# Patient Record
Sex: FEMALE | Race: WHITE | Employment: FULL TIME | URBAN - METROPOLITAN AREA
[De-identification: names, ages, dates, MRNs, and addresses within clinical notes are randomized per-mention and may not be internally consistent; named-entity substitution may affect disease eponyms.]

---

## 2017-05-03 ENCOUNTER — HOSPITAL ENCOUNTER (OUTPATIENT)
Dept: MRI IMAGING | Age: 42
Discharge: HOME | End: 2017-05-03
Payer: COMMERCIAL

## 2017-05-03 DIAGNOSIS — M51.36 DDD (DEGENERATIVE DISC DISEASE), LUMBAR: ICD-10-CM

## 2017-07-20 ENCOUNTER — HOSPITAL ENCOUNTER (OUTPATIENT)
Dept: ULTRASOUND IMAGING | Age: 42
Discharge: HOME | End: 2017-07-20
Payer: COMMERCIAL

## 2017-07-20 ENCOUNTER — HOSPITAL ENCOUNTER (OUTPATIENT)
Dept: WOMENS IMAGING | Age: 42
Discharge: HOME | End: 2017-07-20
Payer: COMMERCIAL

## 2017-07-20 DIAGNOSIS — R92.8 ABNORMAL MAMMOGRAM OF RIGHT BREAST: ICD-10-CM

## 2017-07-20 DIAGNOSIS — R92.8 ABNORMALITY OF RIGHT BREAST ON SCREENING MAMMOGRAPHY: ICD-10-CM

## 2017-07-20 PROCEDURE — 76642 ULTRASOUND BREAST LIMITED: CPT

## 2017-07-20 PROCEDURE — G0206 DX MAMMO INCL CAD UNI: HCPCS

## 2017-08-29 ENCOUNTER — HOSPITAL ENCOUNTER (OUTPATIENT)
Dept: MRI IMAGING | Age: 42
Discharge: HOME | End: 2017-08-29
Payer: COMMERCIAL

## 2017-08-29 PROCEDURE — 72148 MRI LUMBAR SPINE W/O DYE: CPT

## 2018-02-01 ENCOUNTER — HOSPITAL ENCOUNTER (OUTPATIENT)
Dept: ULTRASOUND IMAGING | Age: 43
Discharge: HOME | End: 2018-02-03
Payer: COMMERCIAL

## 2018-02-01 ENCOUNTER — HOSPITAL ENCOUNTER (OUTPATIENT)
Dept: WOMENS IMAGING | Age: 43
Discharge: HOME | End: 2018-02-03
Payer: COMMERCIAL

## 2018-02-01 DIAGNOSIS — Z87.898 HISTORY OF ABNORMAL MAMMOGRAM: ICD-10-CM

## 2018-02-01 DIAGNOSIS — R92.8 BI-RADS CATEGORY 3 MAMMOGRAM RESULT: ICD-10-CM

## 2018-02-01 PROCEDURE — 77066 DX MAMMO INCL CAD BI: CPT

## 2018-02-01 PROCEDURE — 76642 ULTRASOUND BREAST LIMITED: CPT

## 2019-04-04 ENCOUNTER — HOSPITAL ENCOUNTER (OUTPATIENT)
Dept: WOMENS IMAGING | Age: 44
Discharge: HOME | End: 2019-04-06
Payer: COMMERCIAL

## 2019-04-04 DIAGNOSIS — Z12.31 BREAST CANCER SCREENING BY MAMMOGRAM: ICD-10-CM

## 2019-04-04 PROCEDURE — 77063 BREAST TOMOSYNTHESIS BI: CPT

## 2020-01-30 ENCOUNTER — HOSPITAL ENCOUNTER (OUTPATIENT)
Dept: GENERAL RADIOLOGY | Age: 45
Discharge: HOME | End: 2020-02-01
Payer: COMMERCIAL

## 2020-01-30 PROCEDURE — 72050 X-RAY EXAM NECK SPINE 4/5VWS: CPT

## 2020-03-05 ENCOUNTER — HOSPITAL ENCOUNTER (OUTPATIENT)
Dept: PHYSICAL THERAPY | Age: 45
Setting detail: THERAPIES SERIES
Discharge: HOME | End: 2020-03-05
Payer: COMMERCIAL

## 2020-03-05 PROCEDURE — 97161 PT EVAL LOW COMPLEX 20 MIN: CPT

## 2020-03-05 PROCEDURE — 97110 THERAPEUTIC EXERCISES: CPT

## 2020-03-05 ASSESSMENT — PAIN DESCRIPTION - FREQUENCY: FREQUENCY: CONTINUOUS

## 2020-03-05 ASSESSMENT — PAIN DESCRIPTION - PROGRESSION: CLINICAL_PROGRESSION: NOT CHANGED

## 2020-03-05 ASSESSMENT — PAIN DESCRIPTION - DESCRIPTORS: DESCRIPTORS: CONSTANT;TIGHTNESS;SORE

## 2020-03-05 ASSESSMENT — PAIN DESCRIPTION - ONSET: ONSET: SUDDEN

## 2020-03-05 ASSESSMENT — PAIN DESCRIPTION - LOCATION: LOCATION: NECK

## 2020-03-05 ASSESSMENT — PAIN SCALES - GENERAL: PAINLEVEL_OUTOF10: 3

## 2020-03-05 ASSESSMENT — PAIN - FUNCTIONAL ASSESSMENT: PAIN_FUNCTIONAL_ASSESSMENT: PREVENTS OR INTERFERES WITH ALL ACTIVE AND SOME PASSIVE ACTIVITIES

## 2020-03-05 ASSESSMENT — PAIN DESCRIPTION - ORIENTATION: ORIENTATION: RIGHT

## 2020-03-05 ASSESSMENT — PAIN DESCRIPTION - PAIN TYPE: TYPE: CHRONIC PAIN

## 2020-03-05 NOTE — PROGRESS NOTES
[address]  PHYSICAL THERAPY EVALUATION    Date: 3/5/2020  Patient Name: Deborah Perez       MRN: [id number]   Account: [de-identified]   : 1975  (53 y.o.)   Gender: female   Referring Practitioner: Miki Galvin                 Diagnosis: Mid-cervical disc disorder, unspecified level, Other cervical disc degeneration, mid-cervical region, unspecified level  Treatment Diagnosis: neck pain, B periscapular strength, decreased cervical arom, decreased postural awareness, decreased functional activity tolerance, and increased cervcal mm tightness  Additional Pertinent Hx: Xray= mild degenerative changes         Past Medical History:  has a past medical history of Anxiety, Chronic back pain, Depression, Headache(784.0), Musculoskeletal pain, Neck pain, and Type II or unspecified type diabetes mellitus without mention of complication, not stated as uncontrolled. Past Surgical History:   has a past surgical history that includes Foot surgery (Left) and Arm Surgery (Left). Vital Signs  Patient Currently in Pain: Yes   Pain Screening  Patient Currently in Pain: Yes  Pain Assessment  Pain Assessment: 0-10  Pain Level: 3(Painn ranges from 1-7/10)  Pain Type: Chronic pain  Pain Location: Neck  Pain Orientation: Right  Pain Descriptors: Constant;Tightness; Sore  Pain Frequency: Continuous  Pain Onset: Sudden  Clinical Progression: Not changed  Functional Pain Assessment: Prevents or interferes with all active and some passive activities  Non-Pharmaceutical Pain Intervention(s): Rest     ADL Assistance: Independent  Homemaking Assistance: Independent  Homemaking Responsibilities: Yes  Ambulation Assistance: Independent  Transfer Assistance: Independent  Active : Yes  Occupation: Full time employment  Type of occupation: / at [employer]   [facility]ADL [facility]   Additional Comments: R hand dominant      Subjective:  Subjective: Pt reports havign a HA in January 2020 that she went to MD for who took xrays which showed arthritis. MD then referred her to PT and pt takes pain meds prior secondary to fibromyalgia. Pt sees pain managemtne regularly. Pain in neck as well as HA's have been intermittent since January. Denies changes in vision, speech, swallowing, radicular symptoms, dizziness, or unexplained weight loss. Comments: RTD today     Objective:   Strength RUE  Comment: 4+/5 except abd, ER, MT, Rhomboids 4/5 Lats, LT 4-/5   Strength LUE  Comment: 4+/5 except abd, MT, Rhomboids, Lats 4/5 LT 4-/5     AROM RUE (degrees)  RUE AROM : WFL  AROM LUE (degrees)  LUE AROM : WFL    Spine  Cervical: flex 50 ext 45 SB L 38 35 Rot R L 45 56(pain with end range ext and B SB)    Observation/Palpation  Posture: Fair  Palpation: significant tightness throughout B UT, LS, scalenes, cervical paraspinals, sub-occipitals; TTP throughout R side with multiple palpable TP's   Observation: rounded shoulders, FHP, prominent CTJ, protracted scaps     Additional Measures  Special Tests: (+) L cervical flexion/rotation, (-) cervical distraction  Other: relief with cervical distraction    Exercises:   Exercises  Exercise 1: supine chin nods 5\"x10   Exercise 2: supine chin nod with L rotation 5\"x10   Exercise 3: scap retractions 5\"x10   Exercise 4: cervical arom x10   Exercise 5: UBE for postural strengthening*   Exercise 6: supine cerv retractions*  Exercise 7: corner stretch*  Exercise 8: chest pulls*  Exercise 9: B ER*  Exercise 10: prone horiz abd IR/ER*  Exercise 11:  [place] and arrow*  Exercise 12: tband rows/lats*   Exercise 20: HEP: scap retract, chin nods, chin nods with L rotation, cervical arom    Modalities:  Modalities  Moist heat: PRN to decrease mm tightness/pain* pt declined this date  Cervical traction: consider if relief with manual*  Manual:  Manual therapy  Manual traction: cervical spine*   Soft Tissue Mobalization: STM B UT, cervical parspinals, LSS, [] Yes  [x] No 15  0    Ambulatory Aid [] Furniture  [] Crutches/cane/walker  [x] None/bedrest/wheelchair/nurse 30  15  0    IV/Heparin Lock [] Yes  [x] No 20  0    Gait/Transferring [] Impaired  [] Weak  [x] Normal/bedrest/immobile 20  10  0    Mental Status [] Forgets limitations  [x] Oriented to own ability 15  0       Total: 0     Based on the Assessment score: check the appropriate box.   [x]  No intervention needed   Low =   Score of 0-24  []  Use standard prevention interventions Moderate =  Score of 24-44   [] Discuss fall prevention strategies   [] Indicate moderate falls risk on eval  []  Use high risk prevention interventions High = Score of 45 and higher   [] Discuss fall prevention strategies   [] Provide supervision during treatment time    Goals  Short term goals  Time Frame for Short term goals: 2-3 weeks   Short term goal 1: The pt will demonstrate improved postural awareness requiring <25% VC's with exercises  Short term goal 2: The pt will report decreased neck pain </=2/10 in order to perform functional ADL's  Long term goals  Time Frame for Long term goals : 5 weeks   Long term goal 1: The pt will demo improved cervical ext, B SB, and rotation AROM WFL's in order to increase ease with ADL's  Long term goal 2: The pt will demonstrate improved B periscapular strength >/=4 to 4+/5 in order to lift/carry with decreased pain   Long term goal 3: The pt will have a decrease in NDI score >/=10 points in order to increase functional activity tolerance   Long term goal 4: The pt will be indep/compliant with HEP in order to self manage symptoms upon D/C    Treatment Initiated : ther ex and hep     PT Individual Minutes  Time In: 1104  Time Out: 6675  Minutes: 33  Timed Code Treatment Minutes: 10 Minutes  Procedure Minutes: 23 eval     Timed Activity Minutes Units   Ther Ex 10 1     Electronically signed by Tony Vitale PT on 3/5/20 at 11:49 AM

## 2020-03-05 NOTE — PROGRESS NOTES
13/50 [] yes  [x] no   Long term goal 4: The pt will be indep/compliant with HEP in order to self manage symptoms upon D/C ongoing [] yes  [x] no     Body structures, Functions, Activity limitations: Decreased ADL status, Decreased ROM, Decreased strength, Increased pain, Decreased posture  Assessment: Pt presents with onset of neck pain >/=2 months with decreased B periscapular strength, decreased cervical arom, decreased postural awareness, decreased functional activity tolerance, and increased cervcal mm tightness. These impairments currently limit her fucntional abilities to lift, carry, drive, read, or sleep without increased pain or limitations at PLOF. Prognosis: Good, Excellent  Discharge Recommendations: Continue to assess pending progress    PT Education: Goals;PT Role;Plan of Care;Home Exercise Program  Patient Education: discussed cupping technique/procedure/benefits     PLAN: [x] Evaluate and Treat  Frequency/Duration:  Plan  Times per week: 2  Plan weeks: 5  Current Treatment Recommendations: Strengthening, ROM, Patient/Caregiver Education & Training, Manual Therapy - Joint Manipulation, Manual Therapy - Soft Tissue Mobilization, Home Exercise Program, Integrated Dry Needling, Modalities, Pain Management, Positioning                  Patient Status:[x] Continue/ Initiate plan of Care    [] Discharge PT. Recommend pt continue with HEP. [] Additional visits requested, Please re-certify for additional visits:        Signature: Electronically signed by Lisa Lindo PT on 3/5/20 at 11:50 AM    If you have any questions or concerns, please don't hesitate to call. Thank you for your referral.    I have reviewed this plan of care and certify a need for medically necessary rehabilitation services.     Physician Signature:__________________________________________________________  Date:  Please sign and return

## 2020-03-12 ENCOUNTER — HOSPITAL ENCOUNTER (OUTPATIENT)
Dept: PHYSICAL THERAPY | Age: 45
Setting detail: THERAPIES SERIES
Discharge: HOME | End: 2020-03-12
Payer: COMMERCIAL

## 2020-03-12 NOTE — PROGRESS NOTES
[address]       Physical Therapy  Cancellation/No-show Note  Patient Name:  Edilma Smith  :  1975   Date:  3/12/2020  Referring Practitioner: Frances Simmonds  Diagnosis: Mid-cervical disc disorder, unspecified level, Other cervical disc degeneration, mid-cervical region, unspecified level    Visit Information:  PT Visit Information  Onset Date: 20  [facility]   Total # of Visits Approved: 30  Total # of Visits to Date: 1  No Show: 0  Canceled Appointment: 1  Progress Note Counter: 1/10 CX 3/12/20    For today's appointment patient:  [x]  Cancelled  []  Rescheduled appointment  []  No-show   []  Called pt to remind of next appointment     Reason given by patient:  [x]  Patient ill  []  Conflicting appointment  []  No transportation    []  Conflict with work  []  No reason given  []  Other:      Signature: Electronically signed by Negin Cervantes PTA on 3/12/20 at 12:10 PM EDT

## 2020-03-19 ENCOUNTER — HOSPITAL ENCOUNTER (OUTPATIENT)
Dept: PHYSICAL THERAPY | Age: 45
Setting detail: THERAPIES SERIES
Discharge: HOME | End: 2020-03-19
Payer: COMMERCIAL

## 2020-03-19 PROCEDURE — 97110 THERAPEUTIC EXERCISES: CPT

## 2020-03-19 PROCEDURE — 97140 MANUAL THERAPY 1/> REGIONS: CPT

## 2020-03-19 ASSESSMENT — PAIN DESCRIPTION - ORIENTATION: ORIENTATION: LEFT

## 2020-03-19 ASSESSMENT — PAIN DESCRIPTION - DESCRIPTORS: DESCRIPTORS: CONSTANT

## 2020-03-19 ASSESSMENT — PAIN DESCRIPTION - LOCATION: LOCATION: NECK

## 2020-03-19 ASSESSMENT — PAIN SCALES - GENERAL: PAINLEVEL_OUTOF10: 3

## 2020-03-19 NOTE — PROGRESS NOTES
[address] [address]  Outpatient Physical Therapy    Treatment Note        Date: 3/19/2020  Patient: Dennys Gamez  : 1975  ACCT #: [de-identified]  Referring Practitioner: Noy Elizabeth  Diagnosis: Mid-cervical disc disorder, unspecified level, Other cervical disc degeneration, mid-cervical region, unspecified level    Visit Information:  PT Visit Information  Onset Date: 20  PT Insurance Information: caresource   Total # of Visits Approved: 30  Total # of Visits to Date: 2  No Show: 0  Canceled Appointment: 1  Progress Note Counter: 2/10    Subjective: Pt reports tightness with increased stress at work. Pt works at [employer]. HEP Compliance:  [x] Good [] Fair [] Poor [] Reports not doing due to:    Vital Signs  Patient Currently in Pain: Yes   Pain Screening  Patient Currently in Pain: Yes  Pain Assessment  Pain Assessment: 0-10  Pain Level: 3  Pain Location: Neck  Pain Orientation: Left  Pain Descriptors: Constant    OBJECTIVE:   Exercises  Exercise 1: supine chin nods 5 sec x 10   Exercise 2: supine chin nod with L rotation 5 sec x 10   Exercise 3: scap retractions 5 sec x 15  Exercise 4: cervical arom x10   Exercise 5: UBE for postural strengthening L1 F/R x2 min ea  Exercise 7: corner stretch arms low 30 sec x 3   Exercise 8: Supine chest pulls IR/ER YTB x10 - trialed standing with improved tolerance  Exercise 20: HEP: corner str, chest pulls     Manual:   Manual therapy  Manual traction: cervical spine*   Soft Tissue Mobalization: STM B UT, cervical parspinals, LSS, scalenes, sub-occipitals, consider MFD with cupping*  Other: total manual: 15 min    *Indicates exercise, modality, or manual techniques to be initiated when appropriate    Assessment: Body structures, Functions, Activity limitations: Decreased ADL status, Decreased ROM, Decreased strength, Increased pain, Decreased posture  Assessment: Initiated therex and manual per PT POC.  Pt requiring MOD cues for proper technique with

## 2020-03-30 NOTE — PROGRESS NOTES
Due to the COVID-19 pandemic and the subsequent directive from Tony Landry, [address] and [facility] ([facility]), this patient's program is being diverted to a home exercise based program starting March 23, 2020. A formal home program has been or will be established for the patient while services are temporarily suspended. Full frequency may be re-instated upon lifting of the directive.      Electronically signed by Ben Reynoso PT on 3/30/20 at 1:50 PM EDT

## 2020-04-21 ENCOUNTER — CLINICAL DOCUMENTATION (OUTPATIENT)
Dept: PHYSICAL THERAPY | Age: 45
End: 2020-04-21

## 2020-05-20 ENCOUNTER — CLINICAL DOCUMENTATION (OUTPATIENT)
Dept: PHYSICAL THERAPY | Age: 45
End: 2020-05-20

## 2020-06-01 ENCOUNTER — CLINICAL DOCUMENTATION (OUTPATIENT)
Dept: PHYSICAL THERAPY | Age: 45
End: 2020-06-01

## 2020-06-09 ENCOUNTER — TELEPHONE (OUTPATIENT)
Dept: OBGYN CLINIC | Age: 45
End: 2020-06-09

## 2020-06-18 ENCOUNTER — HOSPITAL ENCOUNTER (OUTPATIENT)
Dept: PHYSICAL THERAPY | Age: 45
Setting detail: THERAPIES SERIES
Discharge: HOME | End: 2020-06-18
Payer: COMMERCIAL

## 2020-06-18 PROCEDURE — 97110 THERAPEUTIC EXERCISES: CPT

## 2020-06-18 PROCEDURE — 97140 MANUAL THERAPY 1/> REGIONS: CPT

## 2020-06-18 ASSESSMENT — PAIN DESCRIPTION - DESCRIPTORS: DESCRIPTORS: TIGHTNESS

## 2020-06-18 ASSESSMENT — PAIN DESCRIPTION - LOCATION: LOCATION: NECK

## 2020-06-18 ASSESSMENT — PAIN DESCRIPTION - ORIENTATION: ORIENTATION: RIGHT;LEFT

## 2020-06-18 ASSESSMENT — PAIN SCALES - GENERAL: PAINLEVEL_OUTOF10: 4

## 2020-06-18 ASSESSMENT — PAIN DESCRIPTION - PAIN TYPE: TYPE: CHRONIC PAIN

## 2020-06-18 NOTE — PROGRESS NOTES
[address]     Ph: [phone]  Fax: [phone]    [] Certification  [] Recertification []  Plan of Care  [x] Progress Note [] Discharge      To:  Yany Yee      From:  Marget Romberg, PT, DPT  Patient: Mery Bae     : 1975  Diagnosis: Mid-cervical disc disorder, unspecified level, Other cervical disc degeneration, mid-cervical region, unspecified level     Date: 2020  Treatment Diagnosis: neck pain, B periscapular strength, decreased cervical arom, decreased postural awareness, decreased functional activity tolerance, and increased cervcal mm tightness     Progress Report Period from:  3/5/2020  to 2020    Total # of Visits to Date: 3   No Show: 0    Canceled Appointment: 1     OBJECTIVE:   Short Term Goals - Time Frame for Short term goals: 2-3 weeks     Goals Current/Discharge status  Met   Short term goal 1: The pt will demonstrate improved postural awareness requiring <25% VC's with exercises  Improving awareness with VC's ~25% during tx  [x] yes  [] no   Short term goal 2: The pt will report decreased neck pain </=2/10 in order to perform functional ADL's  6/10 at worst that is \"tight\" [] yes  [x] no     Long Term Goals - Time Frame for Long term goals : 5 weeks   Goals Current/ Discharge status Met   Long term goal 1: The pt will demo improved cervical ext, B SB, and rotation AROM WFL's in order to increase ease with ADL's Spine  Cervical: ext 37 SB L 32 R 30 Rot R 65 L 60   [] yes  [x] no   Long term goal 2: The pt will demonstrate improved B periscapular strength >/=4 to 4+/5 in order to lift/carry with decreased pain  Strength RUE  Comment: MT 4/5 Rhomboids, LT, Lats 4-/5   Strength LUE  Comment: Rhomboids, Lats 4/5 LT, MT 3+/5  [] yes  [x] no   Long term goal 3: The pt will have a decrease in NDI score >/=10 points in order to increase functional activity tolerance  NDI= 10/50 [] yes  [x] no   Long term goal 4: The pt will be indep/compliant with HEP in order to self manage symptoms upon D/C Compliant with ongoing HEP  [x] yes  [x] no     Body structures, Functions, Activity limitations: Decreased ADL status, Decreased ROM, Decreased strength, Increased pain, Decreased posture  Assessment: Pt presents after lapse in tx d/t COVID-19. Pt currently demos contd decreased cervical AROM, B periscapular strength, cervical pain and mm tightness. She demos improved postural awareness and functional activity tolerance with decreased HA frequency during recent hold. However conts to have limitations in function and pain with work duties. Pt would cont to benefit from PT to further address. Specific instructions for Next Treatment: Consider cupping  Prognosis: Good, Excellent  Discharge Recommendations: Continue to assess pending progress    Patient Education: discussed cupping technique/procedure/benefits     PLAN:   Frequency/Duration:  Plan  Times per week: 1  Plan weeks: 5  Specific instructions for Next Treatment: Consider cupping  Current Treatment Recommendations: Strengthening, ROM, Patient/Caregiver Education & Training, Manual Therapy - Joint Manipulation, Manual Therapy - Soft Tissue Mobilization, Home Exercise Program, Integrated Dry Needling, Modalities, Pain Management, Positioning  Plan Comment: Pt requesting 1x/week d/t work          Patient Status:[x] Continue/ Initiate plan of Care    [] Discharge PT. Recommend pt continue with HEP. [] Additional visits requested, Please re-certify for additional visits:        Signature: Electronically signed by Parviz Burch PT on 6/18/20 at 8:57 AM EDT    If you have any questions or concerns, please don't hesitate to call. Thank you for your referral.    I have reviewed this plan of care and certify a need for medically necessary rehabilitation services.     Physician Signature:__________________________________________________________  Date:  Please sign and return

## 2020-06-18 NOTE — PROGRESS NOTES
structures, Functions, Activity limitations: Decreased ADL status, Decreased ROM, Decreased strength, Increased pain, Decreased posture  Assessment: Pt presents after lapse in tx d/t COVID-19. Pt currently demos contd decreased cervical AROM, B periscapular strength, cervical pain and mm tightness. She demos improved postural awareness and functional activity tolerance with decreased HA frequency during recent hold. However conts to have limitations in function and pain with work duties. Pt would cont to benefit from PT to further address. Treatment Diagnosis: neck pain, B periscapular strength, decreased cervical arom, decreased postural awareness, decreased functional activity tolerance, and increased cervcal mm tightness  Prognosis: Good, Excellent  Patient Education: discussed cupping technique/procedure/benefits     Goals:  Short term goals  Time Frame for Short term goals: 2-3 weeks   Short term goal 1: The pt will demonstrate improved postural awareness requiring <25% VC's with exercises  Short term goal 2: The pt will report decreased neck pain </=2/10 in order to perform functional ADL's    Long term goals  Time Frame for Long term goals : 5 weeks   Long term goal 1: The pt will demo improved cervical ext, B SB, and rotation AROM WFL's in order to increase ease with ADL's  Long term goal 2: The pt will demonstrate improved B periscapular strength >/=4 to 4+/5 in order to lift/carry with decreased pain   Long term goal 3: The pt will have a decrease in NDI score >/=10 points in order to increase functional activity tolerance   Long term goal 4: The pt will be indep/compliant with HEP in order to self manage symptoms upon D/C  Progress toward goals: fair, d/t lapse in tx, see PN    POST-PAIN       Pain Rating (0-10 pain scale):  3 /10 \"feels like you got a lot of the kinks out\"  Location and pain description same as pre-treatment unless indicated.    Action: [] NA   [x] Perform HEP  [] Meds as prescribed  []

## 2020-07-09 ENCOUNTER — HOSPITAL ENCOUNTER (OUTPATIENT)
Dept: PHYSICAL THERAPY | Age: 45
Setting detail: THERAPIES SERIES
Discharge: HOME | End: 2020-07-09
Payer: COMMERCIAL

## 2020-07-09 PROCEDURE — 97140 MANUAL THERAPY 1/> REGIONS: CPT

## 2020-07-09 PROCEDURE — 97110 THERAPEUTIC EXERCISES: CPT

## 2020-07-09 NOTE — PROGRESS NOTES
[address] [address]  Outpatient Physical Therapy    Treatment Note        Date: 2020  Patient: Ibeth Hung  : 1975  ACCT #: [de-identified]  Referring Practitioner: Sylvester Tubbs  Diagnosis: Mid-cervical disc disorder, unspecified level, Other cervical disc degeneration, mid-cervical region, unspecified level    Visit Information:  PT Visit Information  Onset Date: 20  PT Insurance Information: caresource   Total # of Visits Approved: 30  Total # of Visits to Date: 4  No Show: 0  Canceled Appointment: 1  Progress Note Counter:     Subjective: Pt presents after recent lapse in tx d/t difficulty scheduling with work schedule. Pt reports improving pain levels with use of HEP though conts to have \"tightness. \"      HEP Compliance:  [x] Good [] Fair [] Poor [] Reports not doing due to:    Vital Signs  Patient Currently in Pain: Denies   Pain Screening  Patient Currently in Pain: Denies    OBJECTIVE:   Exercises  Exercise 1: supine chin nods 5 sec x 20  Exercise 2: UT stretch hailey 30\"x3   Exercise 5: UBE for postural strengthening L1.5 x5 min F/R  Exercise 7: corner stretch arms mid 30 sec x 3   Exercise 8: Seated chest pulls IR/ER RTB x10   Exercise 9: B ER RTB x15   Exercise 10: prone horiz abd IR/ER x10   Exercise 11: [place] and arrow RTB x10  Exercise 12: tband rows/lats RTB x10  Exercise 13: prone rows/lats x10  Exercise 20: HEP: cont current     Strength: [x] NT  [] MMT completed:    ROM: [x] NT  [] ROM measurements:     Manual:   Manual therapy  Manual traction: cervical spine 30\"x5   Soft Tissue Mobalization: Pt declined cupping; STM/TPR B UT, cervical parapsinals, scalenes, LS x12 min total manual   *Indicates exercise, modality, or manual techniques to be initiated when appropriate    Assessment:    Body structures, Functions, Activity limitations: Decreased ADL status, Decreased ROM, Decreased strength, Increased pain, Decreased posture  Assessment: Pt conts to demo tightness throughout cervical spine improved with manual. Contd to progress postural stability ex to imporve cervical alignment and decrease tightness. Pt with good bernadette to tx with no c/o increased pain throughout. Treatment Diagnosis: neck pain, B periscapular strength, decreased cervical arom, decreased postural awareness, decreased functional activity tolerance, and increased cervcal mm tightness  Prognosis: Good, Excellent     Goals:  Short term goals  Time Frame for Short term goals: 2-3 weeks   Short term goal 1: The pt will demonstrate improved postural awareness requiring <25% VC's with exercises  Short term goal 2: The pt will report decreased neck pain </=2/10 in order to perform functional ADL's    Long term goals  Time Frame for Long term goals : 5 weeks   Long term goal 1: The pt will demo improved cervical ext, B SB, and rotation AROM WFL's in order to increase ease with ADL's  Long term goal 2: The pt will demonstrate improved B periscapular strength >/=4 to 4+/5 in order to lift/carry with decreased pain   Long term goal 3: The pt will have a decrease in NDI score >/=10 points in order to increase functional activity tolerance   Long term goal 4: The pt will be indep/compliant with HEP in order to self manage symptoms upon D/C  Progress toward goals: good    POST-PAIN       Pain Rating (0-10 pain scale):   0/10   Location and pain description same as pre-treatment unless indicated. Action: [] NA   [x] Perform HEP  [] Meds as prescribed  [] Modalities as prescribed   [] Call Physician     Frequency/Duration:  Plan  Times per week: 1  Plan weeks: 5  Current Treatment Recommendations: Strengthening, ROM, Patient/Caregiver Education & Training, Manual Therapy - Joint Manipulation, Manual Therapy - Soft Tissue Mobilization, Home Exercise Program, Integrated Dry Needling, Modalities, Pain Management, Positioning  Plan Comment: Pt requesting 1x/week d/t work  Pt to continue current HEP.   See objective section for any therapeutic exercise changes, additions or modifications this date.     PT Individual Minutes  Time In: 2798  Time Out: 1200  Minutes: 38  Timed Code Treatment Minutes: 38 Minutes  Procedure Minutes: 0     Timed Activity Minutes Units   Ther Ex 26 2   Manual  12 1       Signature:  Electronically signed by Hortencia Franklin PT on 7/9/20 at 12:02 PM EDT

## 2020-07-23 ENCOUNTER — HOSPITAL ENCOUNTER (OUTPATIENT)
Dept: PHYSICAL THERAPY | Age: 45
Setting detail: THERAPIES SERIES
Discharge: HOME | End: 2020-07-23
Payer: COMMERCIAL

## 2020-07-23 PROCEDURE — 97110 THERAPEUTIC EXERCISES: CPT

## 2020-07-23 PROCEDURE — 97140 MANUAL THERAPY 1/> REGIONS: CPT

## 2020-07-23 NOTE — PROGRESS NOTES
[address] [address]  Outpatient Physical Therapy    Treatment Note        Date: 2020  Patient: Eric Dennis  : 1975  ACCT #: [de-identified]  Referring Practitioner: Idelle Councilman  Diagnosis: Mid-cervical disc disorder, unspecified level, Other cervical disc degeneration, mid-cervical region, unspecified level    Visit Information:  PT Visit Information  Onset Date: 20  PT Insurance Information: caresource   Total # of Visits Approved: 30  Total # of Visits to Date: 5  No Show: 0  Canceled Appointment: 1  Progress Note Counter: 3/5    Subjective: Pt reports feeling pretty good today. States will be unable to schedule next week d/t work schedule. Will be in following week. Pt reports pain 5/10 at worst in last 5 days. States compliance with HEP. HEP Compliance:  [x] Good [] Fair [] Poor [] Reports not doing due to:    Vital Signs  Patient Currently in Pain: Denies   Pain Screening  Patient Currently in Pain: Denies    OBJECTIVE:   Exercises  Exercise 1: supine chin nods 5 sec x 20  Exercise 5: UBE for postural strengthening L1.5 x5 min F/R  Exercise 7: corner stretch arms mid 30 sec x 3   Exercise 8: Standing chest pulls IR/ER RTB x15; attempted diagonals with poor technique  Exercise 9: B ER RTB x15   Exercise 10: prone horiz abd IR/ER x15; LT L-1 x 15  Exercise 11: [place] and arrow RTB x15  Exercise 12: tband rows/lats RTB x20  Exercise 13: prone rows/lats x15  Exercise 20: HEP: prone scap    Manual:   Manual therapy  Manual traction: cervical spine 30\"x5   Soft Tissue Mobalization: Pt declined cupping; STM/TPR B UT, cervical parapsinals, scalenes, LS x10 min total manual    *Indicates exercise, modality, or manual techniques to be initiated when appropriate    Assessment: Body structures, Functions, Activity limitations: Decreased ADL status, Decreased ROM, Decreased strength, Increased pain, Decreased posture  Assessment: Cues to decrease compensations with bow and arrow with fair carryover.

## 2020-08-05 ENCOUNTER — HOSPITAL ENCOUNTER (OUTPATIENT)
Dept: PHYSICAL THERAPY | Age: 45
Setting detail: THERAPIES SERIES
Discharge: HOME | End: 2020-08-05
Payer: COMMERCIAL

## 2020-08-05 PROCEDURE — 97110 THERAPEUTIC EXERCISES: CPT

## 2020-08-05 PROCEDURE — 97140 MANUAL THERAPY 1/> REGIONS: CPT

## 2020-08-05 ASSESSMENT — PAIN DESCRIPTION - DESCRIPTORS: DESCRIPTORS: TIGHTNESS

## 2020-08-05 ASSESSMENT — PAIN DESCRIPTION - LOCATION: LOCATION: NECK

## 2020-08-05 ASSESSMENT — PAIN SCALES - GENERAL: PAINLEVEL_OUTOF10: 2

## 2020-08-05 ASSESSMENT — PAIN DESCRIPTION - ORIENTATION: ORIENTATION: RIGHT;LEFT

## 2020-08-05 ASSESSMENT — PAIN DESCRIPTION - PAIN TYPE: TYPE: CHRONIC PAIN

## 2020-08-05 NOTE — PROGRESS NOTES
[address] [address]  Outpatient Physical Therapy    Treatment Note        Date: 2020  Patient: Shyanne Turpin  : 1975  ACCT #: [de-identified]  Referring Practitioner: Beth Morales  Diagnosis: Mid-cervical disc disorder, unspecified level, Other cervical disc degeneration, mid-cervical region, unspecified level    Visit Information:  PT Visit Information  Onset Date: 20  PT Insurance Information: caresource   Total # of Visits Approved: 30  Total # of Visits to Date: 6  No Show: 0  Canceled Appointment: 1  Progress Note Counter:     Subjective: Pt reports \"It's not too bad today. \" Pt reports compliance w/ HEP. Pt reports dec frequency in HA's. Pt agreeable to D/C after NV. HEP Compliance:  [x] Good [] Fair [] Poor [] Reports not doing due to:    Vital Signs  Patient Currently in Pain: Yes   Pain Screening  Patient Currently in Pain: Yes  Pain Assessment  Pain Assessment: 0-10  Pain Level: 2  Pain Type: Chronic pain  Pain Location: Neck  Pain Orientation: Right;Left  Pain Descriptors: Tightness    OBJECTIVE:   Exercises  Exercise 5: UBE for postural strengthening L2.0 x5 min F/R  Exercise 7: corner stretch arms mid 30 sec x 3   Exercise 8: Standing chest pulls IR/ER RTB x15  Exercise 9: B ER RTB x15   Exercise 10: prone horiz abd IR/ER x15; LT L-1 x 15  Exercise 11: [place] and arrow RTB x15    Strength: [] NT  [x] MMT completed:  Strength RUE  Comment: rhomboids, lats, MT 4/5, LT 4-/5  Strength LUE  Comment: rhomboids, lats 4/5, MT and LT 4-/5     ROM: [x] NT  [] ROM measurements:     Manual:   Manual therapy  Manual traction: cervical spine 30\"x5   Soft Tissue Mobalization: STM/TPR B UT, cervical parapsinals, scalenes, LS  Other: total manual: 12 min    *Indicates exercise, modality, or manual techniques to be initiated when appropriate    Assessment:    Body structures, Functions, Activity limitations: Decreased ADL status, Decreased ROM, Decreased strength, Increased pain, Decreased posture  Assessment: VC's initially for correct technique w/ bow and arrow w/ improved carryover. Pt w/ dec tightness palpated during manual. Pt w/ mild inc in parascapular strength. Pt reporting dec pain to 0/10 post tx session. Treatment Diagnosis: neck pain, B periscapular strength, decreased cervical arom, decreased postural awareness, decreased functional activity tolerance, and increased cervcal mm tightness  Prognosis: Good, Excellent     Goals:  Short term goals  Time Frame for Short term goals: 2-3 weeks   Short term goal 1: The pt will demonstrate improved postural awareness requiring <25% VC's with exercises  Short term goal 2: The pt will report decreased neck pain </=2/10 in order to perform functional ADL's  Long term goals  Time Frame for Long term goals : 5 weeks   Long term goal 1: The pt will demo improved cervical ext, B SB, and rotation AROM WFL's in order to increase ease with ADL's  Long term goal 2: The pt will demonstrate improved B periscapular strength >/=4 to 4+/5 in order to lift/carry with decreased pain   Long term goal 3: The pt will have a decrease in NDI score >/=10 points in order to increase functional activity tolerance   Long term goal 4: The pt will be indep/compliant with HEP in order to self manage symptoms upon D/C  Progress toward goals: inc strength, rom, dec pain    POST-PAIN       Pain Rating (0-10 pain scale):  0 /10   Location and pain description same as pre-treatment unless indicated.    Action: [] NA   [x] Perform HEP  [] Meds as prescribed  [] Modalities as prescribed   [] Call Physician     Frequency/Duration:  Plan  Times per week: 1  Plan weeks: 5  Current Treatment Recommendations: Strengthening, ROM, Patient/Caregiver Education & Training, Manual Therapy - Joint Manipulation, Manual Therapy - Soft Tissue Mobilization, Home Exercise Program, Integrated Dry Needling, Modalities, Pain Management, Positioning  Plan Comment: Pt requesting 1x/week d/t work     Pt to

## 2020-08-13 ENCOUNTER — HOSPITAL ENCOUNTER (OUTPATIENT)
Dept: PHYSICAL THERAPY | Age: 45
Setting detail: THERAPIES SERIES
Discharge: HOME | End: 2020-08-13
Payer: COMMERCIAL

## 2020-08-13 NOTE — PROGRESS NOTES
[address]     Ph: [phone]  Fax: [phone]    [] Certification  [] Recertification []  Plan of Care  [] Progress Note [x] Discharge      To:  Tiffanie Marcila      From:  Femi Belcher, PT, DPT  Patient: Danita Silva     : 1975  Diagnosis: Mid-cervical disc disorder, unspecified level, Other cervical disc degeneration, mid-cervical region, unspecified level     Date: 2020     Progress Report Period from:  2020  to 2020    Total # of Visits to Date: 6   No Show: 0    Canceled Appointment: 2     OBJECTIVE:   Short Term Goals - Time Frame for Short term goals: 2-3 weeks     Goals Current/Discharge status  Met   Short term goal 1: The pt will demonstrate improved postural awareness requiring <25% VC's with exercises  Improved awareness noted  [x] yes  [] no   Short term goal 2: The pt will report decreased neck pain </=2/10 in order to perform functional ADL's  2/10 (20) [x] yes  [] no     Long Term Goals - Time Frame for Long term goals : 5 weeks   Goals Current/ Discharge status Met   Long term goal 1: The pt will demo improved cervical ext, B SB, and rotation AROM WFL's in order to increase ease with ADL's Spine  Cervical: ext 37 SB L 32 R 30 Rot R 65 L 60 (20) [] yes  [x] no   Long term goal 2: The pt will demonstrate improved B periscapular strength >/=4 to 4+/5 in order to lift/carry with decreased pain  Strength RUE  Comment: rhomboids, lats, MT 4/5, LT 4-/5  Strength LUE  Comment: rhomboids, lats 4/5, MT and LT 4-/5 (20) [x] yes  [x] no   Long term goal 3: The pt will have a decrease in NDI score >/=10 points in order to increase functional activity tolerance  10/50 (20)  [] yes  [x] no   Long term goal 4: The pt will be indep/compliant with HEP in order to self manage symptoms upon D/C Indep/compliant with current hep  [] yes  [] no     Assessment: Pt with fair

## 2020-08-13 NOTE — PROGRESS NOTES
[address]       Physical Therapy  Cancellation/No-show Note  Patient Name:  Carri Amos  :  1975   Date:  2020  Referring Practitioner: Pilar Reveles  Diagnosis: Mid-cervical disc disorder, unspecified level, Other cervical disc degeneration, mid-cervical region, unspecified level    Visit Information:  PT Visit Information  Onset Date: 20  PT Insurance Information: caresource   Total # of Visits Approved: 30  Total # of Visits to Date: 6  No Show: 0  Canceled Appointment: 2  Progress Note Counter: -cx     For today's appointment patient:  [x]  Cancelled  []  Rescheduled appointment  []  No-show   []  Called pt to remind of next appointment     Reason given by patient:  [x]  Patient ill  []  Conflicting appointment  []  No transportation    []  Conflict with work  []  No reason given  []  Other:       Comments:   Called pt to discuss R/S vs D/C and pt reports feeling good and okay to D/C, indep with HEP     Signature: Electronically signed by Bhupendra Carl PT on 20 at 10:10 AM EDT

## 2020-10-22 ENCOUNTER — HOSPITAL ENCOUNTER (OUTPATIENT)
Dept: WOMENS IMAGING | Age: 45
Discharge: HOME | End: 2020-10-24
Payer: COMMERCIAL

## 2020-10-22 PROCEDURE — 77063 BREAST TOMOSYNTHESIS BI: CPT

## 2021-03-23 ENCOUNTER — HOSPITAL ENCOUNTER (OUTPATIENT)
Dept: GENERAL RADIOLOGY | Age: 46
Discharge: HOME | End: 2021-03-25
Payer: COMMERCIAL

## 2021-03-23 DIAGNOSIS — M54.50 LOW BACK PAIN, UNSPECIFIED BACK PAIN LATERALITY, UNSPECIFIED CHRONICITY, UNSPECIFIED WHETHER SCIATICA PRESENT: ICD-10-CM

## 2021-03-23 PROCEDURE — 72114 X-RAY EXAM L-S SPINE BENDING: CPT

## 2021-09-03 ENCOUNTER — HOSPITAL ENCOUNTER (EMERGENCY)
Age: 46
Discharge: HOME | End: 2021-09-03
Attending: EMERGENCY MEDICINE
Payer: COMMERCIAL

## 2021-09-03 ENCOUNTER — APPOINTMENT (OUTPATIENT)
Dept: GENERAL RADIOLOGY | Age: 46
End: 2021-09-03
Payer: COMMERCIAL

## 2021-09-03 ENCOUNTER — APPOINTMENT (OUTPATIENT)
Dept: CT IMAGING | Age: 46
End: 2021-09-03
Payer: COMMERCIAL

## 2021-09-03 VITALS
WEIGHT: 185 LBS | SYSTOLIC BLOOD PRESSURE: 133 MMHG | HEART RATE: 78 BPM | HEIGHT: 64 IN | OXYGEN SATURATION: 97 % | BODY MASS INDEX: 31.58 KG/M2 | RESPIRATION RATE: 16 BRPM | TEMPERATURE: 99.3 F | DIASTOLIC BLOOD PRESSURE: 82 MMHG

## 2021-09-03 DIAGNOSIS — R53.1 GENERAL WEAKNESS: Primary | ICD-10-CM

## 2021-09-03 LAB
ALBUMIN SERPL-MCNC: 4.7 G/DL (ref 3.5–4.6)
ALP BLD-CCNC: 193 U/L (ref 40–130)
ALT SERPL-CCNC: 21 U/L (ref 0–33)
AMPHETAMINE SCREEN, URINE: ABNORMAL
ANION GAP SERPL CALCULATED.3IONS-SCNC: 16 MEQ/L (ref 9–15)
AST SERPL-CCNC: 17 U/L (ref 0–35)
BARBITURATE SCREEN URINE: ABNORMAL
BASOPHILS ABSOLUTE: 0.1 K/UL (ref 0–0.2)
BASOPHILS RELATIVE PERCENT: 1 %
BENZODIAZEPINE SCREEN, URINE: ABNORMAL
BILIRUB SERPL-MCNC: 0.4 MG/DL (ref 0.2–0.7)
BILIRUBIN URINE: NEGATIVE
BLOOD, URINE: NEGATIVE
BUN BLDV-MCNC: 10 MG/DL (ref 6–20)
CALCIUM SERPL-MCNC: 10.1 MG/DL (ref 8.5–9.9)
CANNABINOID SCREEN URINE: ABNORMAL
CHLORIDE BLD-SCNC: 96 MEQ/L (ref 95–107)
CHP ED QC CHECK: NORMAL
CLARITY: CLEAR
CO2: 24 MEQ/L (ref 20–31)
COCAINE METABOLITE SCREEN URINE: ABNORMAL
COLOR: YELLOW
CREAT SERPL-MCNC: 0.64 MG/DL (ref 0.5–0.9)
EOSINOPHILS ABSOLUTE: 0.2 K/UL (ref 0–0.7)
EOSINOPHILS RELATIVE PERCENT: 2.4 %
ETHANOL PERCENT: NORMAL G/DL
ETHANOL: <10 MG/DL (ref 0–0.08)
GFR AFRICAN AMERICAN: >60
GFR NON-AFRICAN AMERICAN: >60
GLOBULIN: 3.1 G/DL (ref 2.3–3.5)
GLUCOSE BLD-MCNC: 351 MG/DL
GLUCOSE BLD-MCNC: 351 MG/DL (ref 60–115)
GLUCOSE BLD-MCNC: 357 MG/DL (ref 70–99)
GLUCOSE URINE: >=1000 MG/DL
HCG, URINE, POC: NEGATIVE
HCT VFR BLD CALC: 44.3 % (ref 37–47)
HEMOGLOBIN: 15.6 G/DL (ref 12–16)
KETONES, URINE: NEGATIVE MG/DL
LEUKOCYTE ESTERASE, URINE: NEGATIVE
LYMPHOCYTES ABSOLUTE: 2.4 K/UL (ref 1–4.8)
LYMPHOCYTES RELATIVE PERCENT: 30.4 %
Lab: ABNORMAL
Lab: NORMAL
MAGNESIUM: 1.8 MG/DL (ref 1.7–2.4)
MCH RBC QN AUTO: 29.7 PG (ref 27–31.3)
MCHC RBC AUTO-ENTMCNC: 35.1 % (ref 33–37)
MCV RBC AUTO: 84.4 FL (ref 82–100)
METHADONE SCREEN, URINE: ABNORMAL
MONOCYTES ABSOLUTE: 0.3 K/UL (ref 0.2–0.8)
MONOCYTES RELATIVE PERCENT: 3.7 %
NEGATIVE QC PASS/FAIL: NORMAL
NEUTROPHILS ABSOLUTE: 4.8 K/UL (ref 1.4–6.5)
NEUTROPHILS RELATIVE PERCENT: 62.5 %
NITRITE, URINE: NEGATIVE
OPIATE SCREEN URINE: ABNORMAL
OXYCODONE URINE: POSITIVE
PDW BLD-RTO: 12.5 % (ref 11.5–14.5)
PERFORMED ON: ABNORMAL
PH UA: 5.5 (ref 5–9)
PHENCYCLIDINE SCREEN URINE: ABNORMAL
PLATELET # BLD: 230 K/UL (ref 130–400)
POSITIVE QC PASS/FAIL: NORMAL
POTASSIUM SERPL-SCNC: 3.5 MEQ/L (ref 3.4–4.9)
PROPOXYPHENE SCREEN: ABNORMAL
PROTEIN UA: ABNORMAL MG/DL
RBC # BLD: 5.26 M/UL (ref 4.2–5.4)
SODIUM BLD-SCNC: 136 MEQ/L (ref 135–144)
SPECIFIC GRAVITY UA: 1.04 (ref 1–1.03)
TOTAL PROTEIN: 7.8 G/DL (ref 6.3–8)
TROPONIN: <0.01 NG/ML (ref 0–0.01)
URINE REFLEX TO CULTURE: ABNORMAL
UROBILINOGEN, URINE: 0.2 E.U./DL
WBC # BLD: 7.8 K/UL (ref 4.8–10.8)

## 2021-09-03 PROCEDURE — 83735 ASSAY OF MAGNESIUM: CPT

## 2021-09-03 PROCEDURE — 85025 COMPLETE CBC W/AUTO DIFF WBC: CPT

## 2021-09-03 PROCEDURE — 70450 CT HEAD/BRAIN W/O DYE: CPT

## 2021-09-03 PROCEDURE — 2580000003 HC RX 258: Performed by: EMERGENCY MEDICINE

## 2021-09-03 PROCEDURE — 70498 CT ANGIOGRAPHY NECK: CPT

## 2021-09-03 PROCEDURE — 80053 COMPREHEN METABOLIC PANEL: CPT

## 2021-09-03 PROCEDURE — 71045 X-RAY EXAM CHEST 1 VIEW: CPT

## 2021-09-03 PROCEDURE — 70496 CT ANGIOGRAPHY HEAD: CPT

## 2021-09-03 PROCEDURE — 82077 ASSAY SPEC XCP UR&BREATH IA: CPT

## 2021-09-03 PROCEDURE — 6360000004 HC RX CONTRAST MEDICATION: Performed by: EMERGENCY MEDICINE

## 2021-09-03 PROCEDURE — 99285 EMERGENCY DEPT VISIT HI MDM: CPT

## 2021-09-03 PROCEDURE — 36415 COLL VENOUS BLD VENIPUNCTURE: CPT

## 2021-09-03 PROCEDURE — 80307 DRUG TEST PRSMV CHEM ANLYZR: CPT

## 2021-09-03 PROCEDURE — 93005 ELECTROCARDIOGRAM TRACING: CPT | Performed by: EMERGENCY MEDICINE

## 2021-09-03 PROCEDURE — 81003 URINALYSIS AUTO W/O SCOPE: CPT

## 2021-09-03 PROCEDURE — 84484 ASSAY OF TROPONIN QUANT: CPT

## 2021-09-03 RX ORDER — SODIUM CHLORIDE 0.9 % (FLUSH) 0.9 %
10 SYRINGE (ML) INJECTION 2 TIMES DAILY
Status: DISCONTINUED | OUTPATIENT
Start: 2021-09-03 | End: 2021-09-03 | Stop reason: HOSPADM

## 2021-09-03 RX ORDER — 0.9 % SODIUM CHLORIDE 0.9 %
1000 INTRAVENOUS SOLUTION INTRAVENOUS ONCE
Status: COMPLETED | OUTPATIENT
Start: 2021-09-03 | End: 2021-09-03

## 2021-09-03 RX ORDER — OXYCODONE AND ACETAMINOPHEN 7.5; 325 MG/1; MG/1
1 TABLET ORAL 2 TIMES DAILY
COMMUNITY

## 2021-09-03 RX ADMIN — SODIUM CHLORIDE 1000 ML: 9 INJECTION, SOLUTION INTRAVENOUS at 13:10

## 2021-09-03 RX ADMIN — SODIUM CHLORIDE, PRESERVATIVE FREE 10 ML: 5 INJECTION INTRAVENOUS at 13:54

## 2021-09-03 RX ADMIN — IOPAMIDOL 100 ML: 755 INJECTION, SOLUTION INTRAVENOUS at 13:52

## 2021-09-03 NOTE — ED TRIAGE NOTES
Pt was at work eating lunch when she experienced some right side weakness and difficulty eating, her co-workers thought she was going to pass out so they called EMS. Pt arrived to the ER A&O x4, her weakness had resolved, and she passed the neuro check completed by Dr Estefany Brady. Pt denies any pain at this time but states she feels tired.

## 2021-09-03 NOTE — ED NOTES
Bed: 31  Expected date: 9/3/21  Expected time: 12:11 PM  Means of arrival: [facility]  Comments:  53 F - weakness, slurred speech. 161/82,114,98% 2L.       Anise Babinski, RN  09/03/21 0029

## 2021-09-03 NOTE — ED PROVIDER NOTES
[address] [facility] ED  EMERGENCY MEDICINE     Pt Name: Iris Thompson  MRN: [id number]  [place] 1975  Date of evaluation: 9/3/2021  PCP:    Tamara Aguilar MD  Provider: Darío Wisdom, [address] [place] [address]       Chief Complaint   Patient presents with    Fatigue       HISTORY OF PRESENT ILLNESS    HPI     78-year-old female with history of anxiety, depression, chronic back pain and pain management, type 2 diabetes presents to the emergency department with concern for generalized weakness that occurred while she was going back to work. Patient states she already ate and was heading to work when all of a sudden she started feeling some weakness or numbness in her right arm. She also states that she had food in her mouth and felt like she could not chew anymore. Patient denies any nausea or vomiting. She denies any syncopal episode or headache. Denies any vision changes. Denies any numbness or tingling at this time. She came in today with no symptoms at all but states that the episode happened for about 5 seconds. She denies any history of stroke and is not on any blood thinners. Denies any upper respiratory symptoms, chest pain or shortness of breath. Denies diarrhea or constipation. No medical history other than what was stated above. Triage notes and Nursing notes were reviewed by myself. Any discrepancies are addressed above.     PAST MEDICAL HISTORY     Past Medical History:   Diagnosis Date    Anxiety     Chronic back pain     Depression     Headache(784.0)     Musculoskeletal pain     Neck pain     Type II or unspecified type diabetes mellitus without mention of complication, not stated as uncontrolled        SURGICAL HISTORY       Past Surgical History:   Procedure Laterality Date    ARM SURGERY Left     FOOT SURGERY Left        CURRENT MEDICATIONS       Discharge Medication List as of 9/3/2021  3:40 PM      CONTINUE these medications which have NOT CHANGED    Details oxyCODONE-acetaminophen (PERCOCET) 7.5-325 MG per tablet Take 1 tablet by mouth 2 times daily. Historical Med      LORazepam (ATIVAN) 0.5 MG tablet Historical Med      HYDROcodone-acetaminophen (NORCO) 7.5-325 MG per tablet Historical Med      cyclobenzaprine (FLEXERIL) 10 MG tablet Take 10 mg by mouth 3 times daily as needed. Historical Med             ALLERGIES     No Known Allergies    FAMILY HISTORY     History reviewed. No pertinent family history. SOCIAL HISTORY       Social History     Socioeconomic History    Marital status: Single     Spouse name: None    Number of children: None    Years of education: None    Highest education level: None   Occupational History    None   Tobacco Use    Smoking status: Former Smoker    Smokeless tobacco: Never Used    Tobacco comment: quit 4 years ago   Vaping Use    Vaping Use: Never used   Substance and Sexual Activity    Alcohol use: No    Drug use: No    Sexual activity: None   Other Topics Concern    None   Social History Narrative    None     Social Determinants of Health     Financial Resource Strain:     Difficulty of Paying Living Expenses:    Food Insecurity:     Worried About Running Out of Food in the Last Year:     Ran Out of Food in the Last Year:    Transportation Needs:     Lack of Transportation (Medical):      Lack of Transportation (Non-Medical):    Physical Activity:     Days of Exercise per Week:     Minutes of Exercise per Session:    Stress:     Feeling of Stress :    Social Connections:     Frequency of Communication with Friends and Family:     Frequency of Social Gatherings with Friends and Family:     Attends Sikh Services:     Active Member of Clubs or Organizations:     Attends Club or Organization Meetings:     Marital Status:    Intimate Partner Violence:     Fear of Current or Ex-Partner:     Emotionally Abused:     Physically Abused:     Sexually Abused:        REVIEW OF SYSTEMS     Review of Systems Constitutional: Positive for fatigue. Neurological: Positive for weakness and numbness. Except as noted above the remainder of the review of systems was reviewed and is negative. SCREENINGS   NIH Stroke Scale  Interval: Reassessment  Level of Consciousness (1a. ): Alert  LOC Questions (1b. ): Answers both correctly  LOC Commands (1c. ): Performs both tasks correctly  Best Gaze (2. ): Normal  Visual (3. ): No visual loss  Facial Palsy (4. ): Normal symmetrical movement  Motor Arm, Left (5a. ): No drift  Motor Arm, Right (5b. ): No drift  Motor Leg, Left (6a. ): No drift  Motor Leg, Right (6b. ): No drift  Limb Ataxia (7. ): Absent  Sensory (8. ): Normal  Best Language (9. ): No aphasia  Dysarthria (10. ): Normal  Extinction and Inattention (11): No abnormality  Total: 0    Alicia Coma Scale  Eye Opening: Spontaneous  Best Verbal Response: Oriented  Best Motor Response: Obeys commands  H[place] Coma Scale Score: 15               PHYSICAL EXAM    (up to 7 for level 4, 8 or more for level 5)     ED Triage Vitals [09/03/21 1226]   BP Temp Temp Source Pulse Resp SpO2 Height Weight   (!) 148/76 99.3 °F (37.4 °C) Oral 88 16 96 % 5' 4\" (1.626 m) 185 lb (83.9 kg)       Physical Exam  Constitutional:       General: She is not in acute distress. Appearance: Normal appearance. She is normal weight. She is not ill-appearing. HENT:      Head: Normocephalic and atraumatic. Right Ear: External ear normal.      Left Ear: External ear normal.      Nose: Nose normal. No congestion or rhinorrhea. Mouth/Throat:      Mouth: Mucous membranes are moist.      Pharynx: No oropharyngeal exudate or posterior oropharyngeal erythema. Eyes:      General:         Right eye: No discharge. Left eye: No discharge. Extraocular Movements: Extraocular movements intact. Pupils: Pupils are equal, round, and reactive to light. Cardiovascular:      Rate and Rhythm: Normal rate and regular rhythm.       Pulses: modulation, iterative reconstruction, and/or weight based dosing when appropriate to reduce radiation dose to as low as reasonably achievable. CTA HEAD W WO CONTRAST   Final Result   [NEGATIVE CTA HEAD.]               All CT scans at this facility use dose modulation, iterative reconstruction, and/or weight based dosing when appropriate to reduce radiation dose to as low as reasonably achievable. XR CHEST PORTABLE   Final Result      No acute cardiopulmonary process. LABS:  Labs Reviewed   COMPREHENSIVE METABOLIC PANEL - Abnormal; Notable for the following components:       Result Value    Anion Gap 16 (*)     Glucose 357 (*)     Calcium 10.1 (*)     Albumin 4.7 (*)     Alkaline Phosphatase 193 (*)     All other components within normal limits   URINE RT REFLEX TO CULTURE - Abnormal; Notable for the following components:    Glucose, Ur >=1000 (*)     Protein, UA TRACE (*)     All other components within normal limits   URINE DRUG SCREEN - Abnormal; Notable for the following components:    Oxycodone Urine POSITIVE (*)     All other components within normal limits   POCT GLUCOSE - Abnormal; Notable for the following components:    POC Glucose 351 (*)     All other components within normal limits   POCT GLUCOSE - Normal   CBC WITH AUTO DIFFERENTIAL   MAGNESIUM   TROPONIN   ETHANOL   POC PREGNANCY UR-QUAL       All other labs were within normal range or not returned as of this dictation. Please note, any cultures that may have been sent were not resulted at the time of this patient visit. EMERGENCY DEPARTMENT COURSE and Medical Decision Making:     Vitals:    Vitals:    09/03/21 1226 09/03/21 1445   BP: (!) 148/76 133/82   Pulse: 88 78   Resp: 16 16   Temp: 99.3 °F (37.4 °C)    TempSrc: Oral    SpO2: 96% 97%   Weight: 185 lb (83.9 kg)    Height: 5' 4\" (1.626 m)        PROCEDURES: (None if blank)  Procedures       MDM     Patient's symptoms all resolved in a matter of 5 seconds.   She has no risk factors for having a stroke other than diabetes which she states she is not on any medication for. She did have a slightly elevated glucose but was given a liter of fluids for this. Patient states that she is not having any symptoms and does not want to stay in the hospital for any further management. I do not find a risk for her to be discharged at this time and did give her a referral for a neurologist for further assessment outpatient. She is agreeable to the plan of care and will be discharged in stable condition. Strict return precautions and follow up instructions were discussed with the patient with which the patient agrees    ED Medications administered this visit:    Medications   sodium chloride flush 0.9 % injection 10 mL (10 mLs IntraVENous Given 9/3/21 1354)   0.9 % sodium chloride bolus (0 mLs IntraVENous Stopped 9/3/21 1450)   iopamidol (ISOVUE-370) 76 % injection 100 mL (100 mLs IntraVENous Given 9/3/21 1352)         FINAL IMPRESSION      1.  General weakness          DISPOSITION/PLAN   DISPOSITION Decision To Discharge 09/03/2021 03:39:59 PM      PATIENT REFERRED TO:  Dante Bone MD  [id number]  [address], [place]  [address] [place] [address] A  [address] [place] [address] [phone]            DISCHARGE MEDICATIONS:  Discharge Medication List as of 9/3/2021  3:40 PM                 Fabiola Live DO (electronically signed)  Attending Physician, Emergency Department         Fabiola Live DO  09/03/21 9533

## 2021-09-04 LAB
EKG ATRIAL RATE: 82 BPM
EKG P AXIS: 45 DEGREES
EKG P-R INTERVAL: 138 MS
EKG Q-T INTERVAL: 362 MS
EKG QRS DURATION: 76 MS
EKG QTC CALCULATION (BAZETT): 422 MS
EKG R AXIS: 2 DEGREES
EKG T AXIS: 35 DEGREES
EKG VENTRICULAR RATE: 82 BPM

## 2021-09-04 PROCEDURE — 93010 ELECTROCARDIOGRAM REPORT: CPT | Performed by: INTERNAL MEDICINE

## 2021-09-06 LAB
GFR AFRICAN AMERICAN: >60
GFR NON-AFRICAN AMERICAN: >60
PERFORMED ON: NORMAL
POC CREATININE: 0.6 MG/DL (ref 0.6–1.1)
POC SAMPLE TYPE: NORMAL

## 2022-01-26 ENCOUNTER — OFFICE VISIT (OUTPATIENT)
Dept: OBGYN CLINIC | Age: 47
End: 2022-01-26
Payer: COMMERCIAL

## 2022-01-26 VITALS
SYSTOLIC BLOOD PRESSURE: 110 MMHG | DIASTOLIC BLOOD PRESSURE: 66 MMHG | WEIGHT: 176 LBS | HEIGHT: 64 IN | BODY MASS INDEX: 30.05 KG/M2

## 2022-01-26 DIAGNOSIS — Z01.419 ENCOUNTER FOR GYNECOLOGICAL EXAMINATION WITH PAPANICOLAOU SMEAR OF CERVIX: Primary | ICD-10-CM

## 2022-01-26 DIAGNOSIS — Z01.419 ENCOUNTER FOR GYNECOLOGICAL EXAMINATION WITH PAPANICOLAOU SMEAR OF CERVIX: ICD-10-CM

## 2022-01-26 DIAGNOSIS — Z11.51 SCREENING FOR HUMAN PAPILLOMAVIRUS: ICD-10-CM

## 2022-01-26 DIAGNOSIS — Z12.31 SCREENING MAMMOGRAM, ENCOUNTER FOR: ICD-10-CM

## 2022-01-26 PROCEDURE — 99396 PREV VISIT EST AGE 40-64: CPT | Performed by: ADVANCED PRACTICE MIDWIFE

## 2022-01-26 PROCEDURE — G8484 FLU IMMUNIZE NO ADMIN: HCPCS | Performed by: ADVANCED PRACTICE MIDWIFE

## 2022-01-26 RX ORDER — PREGABALIN 50 MG/1
50 CAPSULE ORAL 2 TIMES DAILY
COMMUNITY

## 2022-01-26 RX ORDER — INSULIN GLARGINE 100 [IU]/ML
INJECTION, SOLUTION SUBCUTANEOUS
COMMUNITY
Start: 2021-12-03

## 2022-01-26 RX ORDER — DULAGLUTIDE 0.75 MG/.5ML
INJECTION, SOLUTION SUBCUTANEOUS
COMMUNITY
Start: 2022-01-04

## 2022-01-26 RX ORDER — ASPIRIN 325 MG
325 TABLET ORAL DAILY
COMMUNITY
Start: 2021-12-06 | End: 2022-12-06

## 2022-01-26 RX ORDER — METFORMIN HYDROCHLORIDE 500 MG/1
TABLET, EXTENDED RELEASE ORAL
COMMUNITY
Start: 2022-01-01

## 2022-01-26 RX ORDER — ATORVASTATIN CALCIUM 80 MG/1
80 TABLET, FILM COATED ORAL NIGHTLY
COMMUNITY
Start: 2021-11-29

## 2022-01-26 ASSESSMENT — ENCOUNTER SYMPTOMS
SORE THROAT: 0
COUGH: 0
CONSTIPATION: 0
RHINORRHEA: 0
ABDOMINAL PAIN: 0
VOMITING: 0
SHORTNESS OF BREATH: 0
DIARRHEA: 0
TROUBLE SWALLOWING: 0
NAUSEA: 0
VOICE CHANGE: 0

## 2022-01-26 NOTE — PROGRESS NOTES
Chief Complaint:     Kelly Colbert is a 48 y.o. female who presents here today for complaints of:      Chief Complaint   Patient presents with    Annual Exam     no c/o     History of Present Illness:     Kelly Colbert is a 48 y.o. female who presents for her annual exam.    Concerns Today:    None    Prior Pap History:   16 - NILM    Postmenopausal  Menopausal Symptoms:  None  Post-Menopausal Bleeding:  No  Sexually Active:  Yes  Dyspareunia:  At times, related to dryness  Vaginitis Symptoms:  Occasionally  Frequent UTI's (3 or more within 12 months):  No    Sexual Health:  Gender of Sexual Partners:  Male  Number of sexual contacts within the past 12 months:  1  Possible exposure to an STD within the past 12 months:  No   Personal history of STD's:  None    Past Medical History: Allergies:  Patient has no known allergies. Patient's last menstrual period was 07/10/2014. Obstetrical History:       Past Medical History:   Diagnosis Date    Anxiety     Borderline high cholesterol     Chronic back pain     Depression     Headache(784.0)     Musculoskeletal pain     Neck pain     Stroke ([facility] [address] 2021    Type II or unspecified type diabetes mellitus without mention of complication, not stated as uncontrolled      Medications:     Current Outpatient Medications on File Prior to Visit   Medication Sig Dispense Refill    TRULICITY 8.90 OK/1.5ZH SOPN INJECT 0.75 MG SUBCUTANEOUSLY ONE TIME A WEEK      aspirin 325 MG tablet Take 325 mg by mouth daily      atorvastatin (LIPITOR) 80 MG tablet Take 80 mg by mouth nightly      metFORMIN (GLUCOPHAGE-XR) 500 MG extended release tablet TAKE 2 TABLETS BY MOUTH TWICE DAILY WITH MEALS      insulin glargine (LANTUS SOLOSTAR) 100 UNIT/ML injection pen Inject 24 Units subcutaneously as directed      pregabalin (LYRICA) 50 MG capsule Take 50 mg by mouth 2 times daily.       oxyCODONE-acetaminophen (PERCOCET) 7.5-325 MG per tablet Take 1 tablet by mouth 2 times daily. No current facility-administered medications on file prior to visit. Review of Systems:     Review of Systems   Constitutional: Negative for activity change, appetite change, chills, diaphoresis, fatigue, fever and unexpected weight change. HENT: Negative for congestion, postnasal drip, rhinorrhea, sneezing, sore throat, trouble swallowing and voice change. Respiratory: Negative for cough and shortness of breath. Cardiovascular: Negative for chest pain. Gastrointestinal: Negative for abdominal pain, constipation, diarrhea, nausea and vomiting. Genitourinary: Negative for difficulty urinating, dyspareunia, dysuria, frequency, genital sores, menstrual problem, pelvic pain, vaginal bleeding, vaginal discharge and vaginal pain. Musculoskeletal: Negative for arthralgias and myalgias. Neurological: Negative for dizziness, syncope and headaches. Hematological: Negative for adenopathy. All other systems reviewed and are negative. Physical Exam:     Vitals:  /66   Ht 5' 4\" (1.626 m)   Wt 176 lb (79.8 kg)   LMP 07/10/2014   BMI 30.21 kg/m²     Physical Exam  Vitals and nursing note reviewed. Constitutional:       General: She is not in acute distress. Appearance: Normal appearance. She is not ill-appearing, toxic-appearing or diaphoretic. HENT:      Head: Normocephalic. Nose: Nose normal. No congestion or rhinorrhea. Mouth/Throat:      Mouth: Mucous membranes are moist.   Eyes:      General: No scleral icterus. Right eye: No discharge. Left eye: No discharge. Cardiovascular:      Rate and Rhythm: Normal rate and regular rhythm. Pulses: Normal pulses. Pulmonary:      Effort: Pulmonary effort is normal. No respiratory distress. Chest:   Breasts: Breasts are symmetrical.      Right: No swelling, bleeding, inverted nipple, mass, nipple discharge, skin change, tenderness, axillary adenopathy or supraclavicular adenopathy.       Left: No swelling, bleeding, inverted nipple, mass, nipple discharge, skin change, tenderness, axillary adenopathy or supraclavicular adenopathy. Abdominal:      General: There is no distension. Palpations: Abdomen is soft. There is no mass. Tenderness: There is no abdominal tenderness. There is no guarding or rebound. Hernia: There is no hernia in the left inguinal area or right inguinal area. Genitourinary:     General: Normal vulva. Exam position: Lithotomy position. Pubic Area: No rash or pubic lice. Labia:         Right: No rash, tenderness, lesion or injury. Left: No rash, tenderness, lesion or injury. Urethra: No prolapse, urethral pain, urethral swelling or urethral lesion. Vagina: Normal. No signs of injury and foreign body. No vaginal discharge, erythema, tenderness, bleeding, lesions or prolapsed vaginal walls. Cervix: No cervical motion tenderness, discharge, friability, lesion, erythema, cervical bleeding or eversion. Uterus: Normal. Not deviated, not enlarged, not fixed, not tender and no uterine prolapse. Adnexa: Right adnexa normal and left adnexa normal.        Right: No mass, tenderness or fullness. Left: No mass, tenderness or fullness. Rectum: Normal. No mass or external hemorrhoid. Musculoskeletal:         General: No swelling or deformity. Normal range of motion. Cervical back: Normal range of motion and neck supple. No rigidity. No muscular tenderness. Right lower leg: No edema. Left lower leg: No edema. Lymphadenopathy:      Cervical: No cervical adenopathy. Upper Body:      Right upper body: No supraclavicular, axillary or pectoral adenopathy. Left upper body: No supraclavicular, axillary or pectoral adenopathy. Lower Body: No right inguinal adenopathy. No left inguinal adenopathy. Skin:     General: Skin is warm and dry.       Capillary Refill: Capillary refill takes less than 2 seconds. Coloration: Skin is not jaundiced or pale. Neurological:      General: No focal deficit present. Mental Status: She is alert and oriented to person, place, and time. Mental status is at baseline. Cranial Nerves: No cranial nerve deficit. Sensory: No sensory deficit. Motor: No weakness. Coordination: Coordination normal.      Gait: Gait normal.   Psychiatric:         Mood and Affect: Mood normal.         Behavior: Behavior normal.         Thought Content: Thought content normal.         Judgment: Judgment normal.       Assessment:      Diagnosis Orders   1. Encounter for gynecological examination with Papanicolaou smear of cervix  PAP SMEAR   2. Screening for human papillomavirus  PAP SMEAR   3. Screening mammogram, encounter for  AJAY DIGITAL SCREEN W OR WO CAD BILATERAL     Plan:     1. Annual Exam, Screening for STD's  Pap - Collected  Screening for STD's - Declined    2. Screening for Breast Cancer  Mammogram referral given    Follow Up:  Return in about 1 year (around 1/26/2023) for Annual Well Woman Visit. Orders Placed This Encounter   Procedures    AJAY DIGITAL SCREEN W OR WO CAD BILATERAL     Standing Status:   Future     Standing Expiration Date:   1/26/2023    PAP SMEAR     Patient History:    Patient's last menstrual period was 07/10/2014. OBGYN Status: Postmenopausal  Past Surgical History:  No date: ARM SURGERY; Left  No date: FOOT SURGERY; Left      Social History    Tobacco Use      Smoking status: Former Smoker      Smokeless tobacco: Never Used      Tobacco comment: quit 4 years ago       Standing Status:   Future     Standing Expiration Date:   1/26/2023     Order Specific Question:   Collection Type     Answer: Thin Prep     Order Specific Question:   Prior Abnormal Pap Test     Answer:   No     Order Specific Question:   Screening or Diagnostic     Answer:   Screening     Order Specific Question:   HPV Requested?      Answer:   Yes     Order Specific Question:   High Risk Patient     Answer:   N/A     No orders of the defined types were placed in this encounter.       IMAN Angela CNM

## 2022-01-26 NOTE — PROGRESS NOTES
The patient was asked if she would like a chaperone present for her intimate exam. She  Declined the chaperone.  Susie Macedo CMA ([address] [place] [address])

## 2022-02-01 LAB
HPV COMMENT: NORMAL
HPV TYPE 16: NOT DETECTED
HPV TYPE 18: NOT DETECTED
HPVOH (OTHER TYPES): NOT DETECTED

## 2022-02-07 ENCOUNTER — HOSPITAL ENCOUNTER (OUTPATIENT)
Dept: WOMENS IMAGING | Age: 47
Discharge: HOME | End: 2022-02-09
Payer: COMMERCIAL

## 2022-02-07 DIAGNOSIS — Z12.31 SCREENING MAMMOGRAM, ENCOUNTER FOR: ICD-10-CM

## 2022-02-07 PROCEDURE — 77063 BREAST TOMOSYNTHESIS BI: CPT

## 2023-01-11 DIAGNOSIS — Z12.31 ENCOUNTER FOR SCREENING MAMMOGRAM FOR BREAST CANCER: Primary | ICD-10-CM

## 2023-02-13 ENCOUNTER — HOSPITAL ENCOUNTER (OUTPATIENT)
Dept: WOMENS IMAGING | Age: 48
Discharge: HOME | End: 2023-02-15
Payer: COMMERCIAL

## 2023-02-13 DIAGNOSIS — Z12.31 ENCOUNTER FOR SCREENING MAMMOGRAM FOR BREAST CANCER: ICD-10-CM

## 2023-02-13 PROCEDURE — 77067 SCR MAMMO BI INCL CAD: CPT

## 2023-05-18 ENCOUNTER — HOSPITAL ENCOUNTER (EMERGENCY)
Age: 48
Discharge: HOME | End: 2023-05-18
Payer: COMMERCIAL

## 2023-05-18 ENCOUNTER — APPOINTMENT (OUTPATIENT)
Dept: CT IMAGING | Age: 48
End: 2023-05-18
Payer: COMMERCIAL

## 2023-05-18 VITALS
DIASTOLIC BLOOD PRESSURE: 73 MMHG | WEIGHT: 168 LBS | HEART RATE: 99 BPM | BODY MASS INDEX: 28.68 KG/M2 | HEIGHT: 64 IN | OXYGEN SATURATION: 98 % | TEMPERATURE: 97 F | SYSTOLIC BLOOD PRESSURE: 129 MMHG | RESPIRATION RATE: 17 BRPM

## 2023-05-18 DIAGNOSIS — N30.90 CYSTITIS: Primary | ICD-10-CM

## 2023-05-18 DIAGNOSIS — R10.9 FLANK PAIN: ICD-10-CM

## 2023-05-18 LAB
ALBUMIN SERPL-MCNC: 4.2 G/DL (ref 3.5–4.6)
ALP SERPL-CCNC: 137 U/L (ref 40–130)
ALT SERPL-CCNC: 22 U/L (ref 0–33)
ANION GAP SERPL CALCULATED.3IONS-SCNC: 10 MEQ/L (ref 9–15)
AST SERPL-CCNC: 24 U/L (ref 0–35)
BACTERIA URNS QL MICRO: ABNORMAL /HPF
BASOPHILS # BLD: 0.1 K/UL (ref 0–0.1)
BASOPHILS NFR BLD: 0.6 % (ref 0.1–1.2)
BILIRUB SERPL-MCNC: 0.7 MG/DL (ref 0.2–0.7)
BILIRUB UR QL STRIP: NEGATIVE
BUN SERPL-MCNC: 10 MG/DL (ref 6–20)
CALCIUM SERPL-MCNC: 9.3 MG/DL (ref 8.5–9.9)
CHLORIDE SERPL-SCNC: 102 MEQ/L (ref 95–107)
CLARITY UR: CLEAR
CO2 SERPL-SCNC: 26 MEQ/L (ref 20–31)
COLOR UR: YELLOW
CREAT SERPL-MCNC: 0.64 MG/DL (ref 0.5–0.9)
EOSINOPHIL # BLD: 0.1 K/UL (ref 0–0.4)
EOSINOPHIL NFR BLD: 0.6 % (ref 0.7–5.8)
EPI CELLS #/AREA URNS HPF: ABNORMAL /HPF
ERYTHROCYTE [DISTWIDTH] IN BLOOD BY AUTOMATED COUNT: 12.4 % (ref 11.7–14.4)
GLOBULIN SER CALC-MCNC: 3.6 G/DL (ref 2.3–3.5)
GLUCOSE SERPL-MCNC: 115 MG/DL (ref 70–99)
GLUCOSE UR STRIP-MCNC: NEGATIVE MG/DL
HCT VFR BLD AUTO: 38.9 % (ref 37–47)
HGB BLD-MCNC: 12.7 G/DL (ref 11.2–15.7)
HGB UR QL STRIP: NORMAL
IMM GRANULOCYTES # BLD: 0 K/UL
IMM GRANULOCYTES NFR BLD: 0.3 %
KETONES UR STRIP-MCNC: NEGATIVE MG/DL
LEUKOCYTE ESTERASE UR QL STRIP: NORMAL
LYMPHOCYTES # BLD: 1.7 K/UL (ref 1.2–3.7)
LYMPHOCYTES NFR BLD: 20.9 %
MCH RBC QN AUTO: 28.7 PG (ref 25.6–32.2)
MCHC RBC AUTO-ENTMCNC: 32.6 % (ref 32.2–35.5)
MCV RBC AUTO: 88 FL (ref 79.4–94.8)
MONOCYTES # BLD: 0.5 K/UL (ref 0.2–0.9)
MONOCYTES NFR BLD: 6.2 % (ref 4.7–12.5)
NEUTROPHILS # BLD: 5.7 K/UL (ref 1.6–6.1)
NEUTS SEG NFR BLD: 71.4 % (ref 34–71.1)
NITRITE UR QL STRIP: NEGATIVE
PH UR STRIP: 5.5 [PH] (ref 5–9)
PLATELET # BLD AUTO: 212 K/UL (ref 182–369)
POTASSIUM SERPL-SCNC: 4 MEQ/L (ref 3.4–4.9)
PROT SERPL-MCNC: 7.8 G/DL (ref 6.3–8)
PROT UR STRIP-MCNC: NEGATIVE MG/DL
RBC # BLD AUTO: 4.42 M/UL (ref 3.93–5.22)
RBC #/AREA URNS HPF: ABNORMAL /HPF (ref 0–2)
SODIUM SERPL-SCNC: 138 MEQ/L (ref 135–144)
SP GR UR STRIP: 1.01 (ref 1–1.03)
URINE REFLEX TO CULTURE: NORMAL
UROBILINOGEN UR STRIP-ACNC: 0.2 E.U./DL
WBC # BLD AUTO: 8 K/UL (ref 4–10)
WBC #/AREA URNS HPF: ABNORMAL /HPF (ref 0–5)

## 2023-05-18 PROCEDURE — 6360000002 HC RX W HCPCS: Performed by: NURSE PRACTITIONER

## 2023-05-18 PROCEDURE — 74176 CT ABD & PELVIS W/O CONTRAST: CPT

## 2023-05-18 PROCEDURE — 36415 COLL VENOUS BLD VENIPUNCTURE: CPT

## 2023-05-18 PROCEDURE — 2580000003 HC RX 258: Performed by: NURSE PRACTITIONER

## 2023-05-18 PROCEDURE — 96365 THER/PROPH/DIAG IV INF INIT: CPT

## 2023-05-18 PROCEDURE — 80053 COMPREHEN METABOLIC PANEL: CPT

## 2023-05-18 PROCEDURE — 96375 TX/PRO/DX INJ NEW DRUG ADDON: CPT

## 2023-05-18 PROCEDURE — 81001 URINALYSIS AUTO W/SCOPE: CPT

## 2023-05-18 PROCEDURE — 85025 COMPLETE CBC W/AUTO DIFF WBC: CPT

## 2023-05-18 PROCEDURE — 99284 EMERGENCY DEPT VISIT MOD MDM: CPT

## 2023-05-18 RX ORDER — KETOROLAC TROMETHAMINE 10 MG/1
10 TABLET, FILM COATED ORAL EVERY 6 HOURS PRN
Qty: 20 TABLET | Refills: 0 | Status: SHIPPED | OUTPATIENT
Start: 2023-05-18

## 2023-05-18 RX ORDER — ONDANSETRON 2 MG/ML
4 INJECTION INTRAMUSCULAR; INTRAVENOUS ONCE
Status: COMPLETED | OUTPATIENT
Start: 2023-05-18 | End: 2023-05-18

## 2023-05-18 RX ORDER — PHENAZOPYRIDINE HYDROCHLORIDE 100 MG/1
200 TABLET, FILM COATED ORAL 3 TIMES DAILY PRN
Qty: 6 TABLET | Refills: 0 | Status: SHIPPED | OUTPATIENT
Start: 2023-05-18 | End: 2023-05-18 | Stop reason: SDUPTHER

## 2023-05-18 RX ORDER — KETOROLAC TROMETHAMINE 10 MG/1
10 TABLET, FILM COATED ORAL EVERY 6 HOURS PRN
Qty: 20 TABLET | Refills: 0 | Status: SHIPPED | OUTPATIENT
Start: 2023-05-18 | End: 2023-05-18 | Stop reason: SDUPTHER

## 2023-05-18 RX ORDER — SULFAMETHOXAZOLE AND TRIMETHOPRIM 800; 160 MG/1; MG/1
1 TABLET ORAL 2 TIMES DAILY
Qty: 14 TABLET | Refills: 0 | Status: SHIPPED | OUTPATIENT
Start: 2023-05-18 | End: 2023-05-18 | Stop reason: SDUPTHER

## 2023-05-18 RX ORDER — 0.9 % SODIUM CHLORIDE 0.9 %
1000 INTRAVENOUS SOLUTION INTRAVENOUS ONCE
Status: COMPLETED | OUTPATIENT
Start: 2023-05-18 | End: 2023-05-18

## 2023-05-18 RX ORDER — SULFAMETHOXAZOLE AND TRIMETHOPRIM 800; 160 MG/1; MG/1
1 TABLET ORAL 2 TIMES DAILY
Qty: 14 TABLET | Refills: 0 | Status: SHIPPED | OUTPATIENT
Start: 2023-05-18 | End: 2023-05-25

## 2023-05-18 RX ORDER — PHENAZOPYRIDINE HYDROCHLORIDE 100 MG/1
200 TABLET, FILM COATED ORAL 3 TIMES DAILY PRN
Qty: 6 TABLET | Refills: 0 | Status: SHIPPED | OUTPATIENT
Start: 2023-05-18 | End: 2023-05-20

## 2023-05-18 RX ORDER — MORPHINE SULFATE 4 MG/ML
4 INJECTION, SOLUTION INTRAMUSCULAR; INTRAVENOUS ONCE
Status: COMPLETED | OUTPATIENT
Start: 2023-05-18 | End: 2023-05-18

## 2023-05-18 RX ADMIN — CEFTRIAXONE 1000 MG: 1 INJECTION, POWDER, FOR SOLUTION INTRAMUSCULAR; INTRAVENOUS at 19:34

## 2023-05-18 RX ADMIN — SODIUM CHLORIDE 1000 ML: 9 INJECTION, SOLUTION INTRAVENOUS at 19:00

## 2023-05-18 RX ADMIN — ONDANSETRON 4 MG: 2 INJECTION INTRAMUSCULAR; INTRAVENOUS at 19:00

## 2023-05-18 RX ADMIN — MORPHINE SULFATE 4 MG: 4 INJECTION INTRAVENOUS at 19:00

## 2023-05-18 ASSESSMENT — ENCOUNTER SYMPTOMS
TROUBLE SWALLOWING: 0
APNEA: 0
EYE ITCHING: 0
COUGH: 0
RHINORRHEA: 0
VOICE CHANGE: 0
DIARRHEA: 0
SORE THROAT: 0
COLOR CHANGE: 0
CHEST TIGHTNESS: 0
WHEEZING: 0
PHOTOPHOBIA: 0
SHORTNESS OF BREATH: 0
BACK PAIN: 0
CONSTIPATION: 0
ABDOMINAL PAIN: 0
FACIAL SWELLING: 0
NAUSEA: 0
EYE DISCHARGE: 0
BLOOD IN STOOL: 0
STRIDOR: 0
CHOKING: 0
EYE PAIN: 0
SINUS PRESSURE: 0
ABDOMINAL DISTENTION: 0
SINUS PAIN: 0
EYE REDNESS: 0
VOMITING: 0
ALLERGIC/IMMUNOLOGIC NEGATIVE: 1

## 2023-05-18 NOTE — ED PROVIDER NOTES
[facility] \[facility]\"" ED  EMERGENCY DEPARTMENT ENCOUNTER      Pt Name: Alvarado Yuen  MRN: [id number]  [place] 1975  Date of evaluation: 5/18/2023  Provider: IMAN Orozco CNP    CHIEF COMPLAINT       Chief Complaint   Patient presents with    Hematuria         HISTORY OF PRESENT ILLNESS   (Location/Symptom, Timing/Onset, Context/Setting, Quality, Duration, Modifying Factors, Severity)  Note limiting factors. Alvarado Yuen is a 46 y.o. female who, per chart review, has past medical history of anxiety, chronic back pain, depression, stroke, DM2 presents to the emergency department with c/o constant, moderate, sharp, stabbing R flank pain since last night. Pt reports she was seen by urgent care today and was told she has blood in her urine and should come to the ED for further evaluation. Pt denies dysuria, fever, abd pain. States she is in pain management and took Percocet earlier this morning with no relief. Nursing Notes were reviewed. REVIEW OF SYSTEMS    (2-9 systems for level 4, 10 or more for level 5)     Review of Systems   Constitutional:  Negative for chills, diaphoresis, fatigue and fever. HENT:  Negative for congestion, dental problem, drooling, ear discharge, ear pain, facial swelling, hearing loss, mouth sores, nosebleeds, postnasal drip, rhinorrhea, sinus pressure, sinus pain, sneezing, sore throat, tinnitus, trouble swallowing and voice change. Eyes:  Negative for photophobia, pain, discharge, redness, itching and visual disturbance. Respiratory:  Negative for apnea, cough, choking, chest tightness, shortness of breath, wheezing and stridor. Cardiovascular:  Negative for chest pain, palpitations and leg swelling. Gastrointestinal:  Negative for abdominal distention, abdominal pain, blood in stool, constipation, diarrhea, nausea and vomiting. Endocrine: Negative. Genitourinary:  Positive for flank pain.  Negative for decreased urine volume, difficulty urinating,

## 2023-06-05 ENCOUNTER — HOSPITAL ENCOUNTER (OUTPATIENT)
Dept: GENERAL RADIOLOGY | Age: 48
Discharge: HOME | End: 2023-06-07
Payer: COMMERCIAL

## 2023-06-05 ENCOUNTER — HOSPITAL ENCOUNTER (OUTPATIENT)
Age: 48
Discharge: HOME | End: 2023-06-07
Payer: COMMERCIAL

## 2023-06-05 DIAGNOSIS — M79.672 LEFT FOOT PAIN: ICD-10-CM

## 2023-06-05 PROCEDURE — 73630 X-RAY EXAM OF FOOT: CPT

## 2024-05-31 DIAGNOSIS — Z12.31 BREAST CANCER SCREENING BY MAMMOGRAM: Primary | ICD-10-CM

## 2024-07-29 ENCOUNTER — HOSPITAL ENCOUNTER (OUTPATIENT)
Dept: WOMENS IMAGING | Age: 49
Discharge: HOME | End: 2024-07-31
Attending: OBSTETRICS & GYNECOLOGY
Payer: COMMERCIAL

## 2024-07-29 DIAGNOSIS — Z12.31 BREAST CANCER SCREENING BY MAMMOGRAM: ICD-10-CM

## 2024-07-29 PROCEDURE — 77063 BREAST TOMOSYNTHESIS BI: CPT

## 2024-09-30 ENCOUNTER — TELEMEDICINE (OUTPATIENT)
Dept: PRIMARY CARE | Facility: CLINIC | Age: 49
End: 2024-09-30
Payer: COMMERCIAL

## 2024-09-30 DIAGNOSIS — R05.9 COUGH, UNSPECIFIED TYPE: ICD-10-CM

## 2024-09-30 DIAGNOSIS — J02.9 PHARYNGITIS, UNSPECIFIED ETIOLOGY: Primary | ICD-10-CM

## 2024-09-30 PROBLEM — G43.909 MIGRAINE HEADACHE: Status: ACTIVE | Noted: 2024-09-30

## 2024-09-30 PROBLEM — F41.9 ANXIETY DISORDER: Status: ACTIVE | Noted: 2024-09-30

## 2024-09-30 PROBLEM — E78.5 DYSLIPIDEMIA: Status: ACTIVE | Noted: 2021-09-15

## 2024-09-30 PROBLEM — M19.90 ARTHRITIS: Status: ACTIVE | Noted: 2024-09-30

## 2024-09-30 PROBLEM — R74.01 ELEVATED LIVER TRANSAMINASE LEVEL: Status: ACTIVE | Noted: 2023-02-13

## 2024-09-30 PROBLEM — E11.9 DIABETES MELLITUS, TYPE 2 (MULTI): Status: ACTIVE | Noted: 2024-09-30

## 2024-09-30 PROBLEM — Z86.73 HISTORY OF STROKE: Status: ACTIVE | Noted: 2024-09-30

## 2024-09-30 PROBLEM — E78.5 HYPERLIPIDEMIA: Status: ACTIVE | Noted: 2021-09-07

## 2024-09-30 PROBLEM — I63.512 LEFT MIDDLE CEREBRAL ARTERY STROKE (MULTI): Status: ACTIVE | Noted: 2021-09-04

## 2024-09-30 PROBLEM — E04.1 NONTOXIC THYROID NODULE: Status: ACTIVE | Noted: 2024-09-30

## 2024-09-30 PROBLEM — Z95.828 PRESENCE OF INTERNAL CAROTID STENT: Status: ACTIVE | Noted: 2021-11-29

## 2024-09-30 PROBLEM — Z86.79 HISTORY OF CAROTID STENOSIS: Status: ACTIVE | Noted: 2024-09-30

## 2024-09-30 PROBLEM — I65.22 INTERNAL CAROTID ARTERY STENOSIS, LEFT: Status: ACTIVE | Noted: 2021-11-29

## 2024-09-30 PROCEDURE — 1036F TOBACCO NON-USER: CPT | Performed by: INTERNAL MEDICINE

## 2024-09-30 PROCEDURE — 99442 PR PHYS/QHP TELEPHONE EVALUATION 11-20 MIN: CPT | Performed by: INTERNAL MEDICINE

## 2024-09-30 RX ORDER — LIDOCAINE HYDROCHLORIDE 20 MG/ML
SOLUTION OROPHARYNGEAL
Qty: 200 ML | Refills: 0 | Status: SHIPPED | OUTPATIENT
Start: 2024-09-30

## 2024-09-30 RX ORDER — ASPIRIN 325 MG
325 TABLET ORAL
COMMUNITY
Start: 2024-02-29 | End: 2025-02-28

## 2024-09-30 RX ORDER — PREGABALIN 50 MG/1
50 CAPSULE ORAL 2 TIMES DAILY
COMMUNITY

## 2024-09-30 RX ORDER — METFORMIN HYDROCHLORIDE 500 MG/1
500 TABLET ORAL
COMMUNITY

## 2024-09-30 RX ORDER — DULAGLUTIDE 4.5 MG/.5ML
4.5 INJECTION, SOLUTION SUBCUTANEOUS
COMMUNITY

## 2024-09-30 RX ORDER — AZITHROMYCIN 250 MG/1
TABLET, FILM COATED ORAL
Qty: 6 TABLET | Refills: 0 | Status: SHIPPED | OUTPATIENT
Start: 2024-09-30 | End: 2024-10-04

## 2024-09-30 RX ORDER — BENZONATATE 200 MG/1
200 CAPSULE ORAL 3 TIMES DAILY PRN
Qty: 30 CAPSULE | Refills: 0 | Status: SHIPPED | OUTPATIENT
Start: 2024-09-30 | End: 2024-10-30

## 2024-09-30 RX ORDER — DICYCLOMINE HYDROCHLORIDE 20 MG/1
20 TABLET ORAL EVERY 8 HOURS PRN
COMMUNITY
Start: 2020-01-17 | End: 2024-09-30 | Stop reason: WASHOUT

## 2024-09-30 RX ORDER — INSULIN GLARGINE 100 [IU]/ML
10 INJECTION, SOLUTION SUBCUTANEOUS NIGHTLY
COMMUNITY
End: 2024-09-30 | Stop reason: WASHOUT

## 2024-09-30 RX ORDER — ATORVASTATIN CALCIUM 80 MG/1
80 TABLET, FILM COATED ORAL
COMMUNITY
Start: 2024-02-29 | End: 2025-02-28

## 2024-09-30 RX ORDER — OXYCODONE AND ACETAMINOPHEN 7.5; 325 MG/1; MG/1
1 TABLET ORAL 2 TIMES DAILY
COMMUNITY

## 2024-09-30 ASSESSMENT — PATIENT HEALTH QUESTIONNAIRE - PHQ9
1. LITTLE INTEREST OR PLEASURE IN DOING THINGS: NOT AT ALL
SUM OF ALL RESPONSES TO PHQ9 QUESTIONS 1 & 2: 0
2. FEELING DOWN, DEPRESSED OR HOPELESS: NOT AT ALL

## 2024-09-30 NOTE — PROGRESS NOTES
"Standard Progress Note  Chief Complaint   Patient presents with    Headache    Sore Throat    Earache     Pt of Dr. Noland presents for telephone visit with c/o headache, sore throat and left earache.  Onset 7 days.  Pt states she took a home covid test this AM and it was negative.  Pt unable to provide vital signs.      [phone]  Virtual or Telephone Consent    A telephone visit (audio only) between the patient (at the originating site) and the provider (at the distant site) was utilized to provide this telehealth service.   Verbal consent was requested and obtained from Harini GONZALEZ Enciso on this date, 09/30/24 for a telehealth visit.   Ill x  5days. Sore throat, cough, left ear discomfort, headache, sinus pressure-not draining.  Had cough syrup in the past helped. Reviewed med list-pt on opioid so cannot rx cough syrup with codeine because of potential drug interaction.   Tried mucinex and tylenol sinus. Mucinex makes her tired.     HPI:  Harini Enciso 49 y.o.   female    Patient Active Problem List   Diagnosis    Presence of internal carotid stent    Nontoxic thyroid nodule    Migraine headache    Left middle cerebral artery stroke (Multi)    Internal carotid artery stenosis, left    Hyperlipidemia    History of stroke    History of carotid stenosis    Elevated liver transaminase level    Dyslipidemia    Diabetes mellitus, type 2 (Multi)    Arthritis    Anxiety disorder     Denies history of asthma or copd  Quit smoking ~ 8 years ago.    PE  Speech intact. Able to speak in full sentences      Lab Results   Component Value Date    GLUCOSE 116 (H) 09/19/2023    CALCIUM 9.6 09/19/2023     09/19/2023    K 3.8 09/19/2023    CO2 28 09/19/2023     09/19/2023    BUN 11 09/19/2023    CREATININE 0.69 09/19/2023      Lab Results   Component Value Date    WBC 8.0 09/19/2023    HGB 12.9 09/19/2023    HCT 39.1 09/19/2023    MCV 87 09/19/2023     09/19/2023      No results found for: \"CHOL\"  No " "results found for: \"HDL\"  No results found for: \"LDLCALC\"  No results found for: \"TRIG\"  No components found for: \"CHOLHDL\"      1. Pharyngitis, unspecified etiology  - benzonatate (Tessalon) 200 mg capsule; Take 1 capsule (200 mg) by mouth 3 times a day as needed for cough. Do not crush or chew.  Dispense: 30 capsule; Refill: 0  - lidocaine (Xylocaine) 2 % solution; 1 teaspoon gargled and swallowed  qid prn sore throat.  Dispense: 200 mL; Refill: 0    2. Cough, unspecified type  - benzonatate (Tessalon) 200 mg capsule; Take 1 capsule (200 mg) by mouth 3 times a day as needed for cough. Do not crush or chew.  Dispense: 30 capsule; Refill: 0  - azithromycin (Zithromax) 250 mg tablet; Take 2 tablets (500 mg) by mouth once daily for 1 day, THEN 1 tablet (250 mg) once daily for 4 days. Take 2 tabs (500 mg) by mouth today, than 1 daily for 4 days..  Dispense: 6 tablet; Refill: 0    Should be seen in person if symptoms refractory.  Time 9 minutes direct phone  2 minutes chart prep  3 minutes charting.   Current Outpatient Medications on File Prior to Visit   Medication Sig Dispense Refill    aspirin 325 mg tablet Take 1 tablet (325 mg) by mouth once daily.      atorvastatin (Lipitor) 80 mg tablet Take 1 tablet (80 mg) by mouth once daily.      metFORMIN (Glucophage) 500 mg tablet Take 1 tablet (500 mg) by mouth 2 times daily (morning and late afternoon).      oxyCODONE-acetaminophen (Percocet) 7.5-325 mg tablet Take 1 tablet by mouth 2 times a day.      pregabalin (Lyrica) 50 mg capsule Take 1 capsule (50 mg) by mouth twice a day.      Trulicity 4.5 mg/0.5 mL pen injector Inject 4.5 mg under the skin 1 (one) time per week.      dicyclomine (Bentyl) 20 mg tablet Take 1 tablet (20 mg) by mouth every 8 hours if needed.      insulin glargine (Lantus Solostar U-100 Insulin) 100 unit/mL (3 mL) pen Inject 10 Units under the skin once daily at bedtime.       No current facility-administered medications on file prior to visit. "         Funmi Irene MD

## 2025-02-10 ENCOUNTER — APPOINTMENT (OUTPATIENT)
Dept: PRIMARY CARE | Facility: CLINIC | Age: 50
End: 2025-02-10
Payer: COMMERCIAL

## 2025-02-10 DIAGNOSIS — U07.1 COVID-19 VIREMIA: Primary | ICD-10-CM

## 2025-02-10 RX ORDER — DEXAMETHASONE 6 MG/1
6 TABLET ORAL DAILY
Qty: 7 TABLET | Refills: 0 | Status: SHIPPED | OUTPATIENT
Start: 2025-02-10 | End: 2025-02-17

## 2025-02-10 ASSESSMENT — PATIENT HEALTH QUESTIONNAIRE - PHQ9
2. FEELING DOWN, DEPRESSED OR HOPELESS: NOT AT ALL
1. LITTLE INTEREST OR PLEASURE IN DOING THINGS: NOT AT ALL
SUM OF ALL RESPONSES TO PHQ9 QUESTIONS 1 AND 2: 0

## 2025-02-12 ENCOUNTER — TELEPHONE (OUTPATIENT)
Dept: PRIMARY CARE | Facility: CLINIC | Age: 50
End: 2025-02-12
Payer: COMMERCIAL

## 2025-02-12 NOTE — TELEPHONE ENCOUNTER
Patient had a virtual appointment with Dr. Gusman on Monday after testing positive for covid. Patient was given  dexAMETHasone. Since she started taking she notice her blood sugar has increased. Today's sugar is 212. She advises that her sugar has not been that high in a long time. Patient is asking if it is a possible side effect.

## 2025-02-13 ASSESSMENT — ENCOUNTER SYMPTOMS
BLOOD IN STOOL: 0
ACTIVITY CHANGE: 0
LIGHT-HEADEDNESS: 0
EYE REDNESS: 0
STRIDOR: 0
JOINT SWELLING: 0
PALPITATIONS: 0
HEMATURIA: 0
SPEECH DIFFICULTY: 0
FEVER: 0
CHEST TIGHTNESS: 0

## 2025-02-13 NOTE — TELEPHONE ENCOUNTER
Patient called the office back. Patient states that originally the note was for her to take 5 days off and that she would have to wear a mask. Patient states that she took 6 days off and went back to work today

## 2025-02-13 NOTE — TELEPHONE ENCOUNTER
Patient returned call and gave dates 2/7/2025 was the date that she took off and returned with mask on 2/13/2025. Patient advises that she stopped taking medication due to sugar levels. She advised she took it for 3 days however her her sugar was over 300. She advised that she need to have it stated that she needs be given/ taking a course of medication or leave will not be approved.

## 2025-02-13 NOTE — TELEPHONE ENCOUNTER
Call to patient and left a VM. Patient had papers that she needed filled out for work. Office received the papers. Dr. Gusman needs verification on the dates that where provided. Asked patient to call office back.

## 2025-02-13 NOTE — PROGRESS NOTES
Harini Encisolanie 49 y.o. female was seen today:     Chief Complaint   Patient presents with    COVID      Patient is being called today for testing positive for covid on 2/7/2025. Symptoms started on Thursday evening 2/6/2025. Patient advises that she is fatigued, stuffy nose, no taste and headache. However no fever. Patient also has paper work from work that needs to be filled out for time off.        Harini Enciso    She has been having cough, congestion, thick nasal discharge.  Patient denies any fever and chills.  Patient does not have any history of bronchial asthma.  During this time of the season, patient usually gets similar symptoms. Patient tried over-the-counter remedies but did not feel better.  There is some postnasal drip, occasional sore throat.  No shortness of breath or dyspnea on exertion.  Nobody else is sick in the family.  Otherwise appetite is somewhat good.  These symptoms have been there for about 3 to 4 days now. The COVID rapid antigen test is positive at home.      MEDICATIONS:   Current Outpatient Medications   Medication Instructions    aspirin 325 mg, Daily RT    atorvastatin (LIPITOR) 80 mg, Daily RT    dexAMETHasone (DECADRON) 6 mg, oral, Daily    metFORMIN (GLUCOPHAGE) 500 mg, 2 times daily (morning and late afternoon)    oxyCODONE-acetaminophen (Percocet) 7.5-325 mg tablet 1 tablet, 2 times daily    pregabalin (LYRICA) 50 mg, 2 times daily    Trulicity 4.5 mg, Once Weekly       TODAY'S VISIT  DX:     1. COVID-19 viremia  dexAMETHasone (Decadron) 6 mg tablet           MEDICAL DECISION MAKING:  - Treatment and therapy plan are as above   - Active medical co morbidities have been considered.   - Recent lab work and relevant imaging studies were reviewed.    - Correspondence/notes from specialty consultants were checked.    -Patient will stay 5 days quarantine at home she can go back to work after 5 days with facial mask for 5 days  - Next Follow up in 3 months with PCP    Review  of Systems   Constitutional:  Negative for activity change and fever.   HENT:  Negative for hearing loss, nosebleeds and tinnitus.    Eyes:  Negative for redness.   Respiratory:  Negative for chest tightness and stridor.    Cardiovascular:  Negative for chest pain, palpitations and leg swelling.   Gastrointestinal:  Negative for blood in stool.   Endocrine: Negative for cold intolerance.   Genitourinary:  Negative for hematuria.   Musculoskeletal:  Negative for joint swelling.   Skin:  Negative for rash.   Neurological:  Negative for speech difficulty and light-headedness.   Psychiatric/Behavioral:  Negative for behavioral problems.      Visit Vitals  Smoking Status Former         Wt Readings from Last 10 Encounters:   12/31/18 81.6 kg (179 lb 14.3 oz)     Physical Exam  Constitutional:       General: She is not in acute distress.  HENT:      Nose: No rhinorrhea.   Pulmonary:      Effort: Pulmonary effort is normal.      Breath sounds: No stridor. No wheezing.   Neurological:      Mental Status: She is alert and oriented to person, place, and time.   Psychiatric:         Thought Content: Thought content normal.        RECENT LABS:  Lab Results   Component Value Date    WBC 8.0 09/19/2023    HGB 12.9 09/19/2023    HCT 39.1 09/19/2023     09/19/2023    ALT 20 09/19/2023    AST 25 09/19/2023     09/19/2023    K 3.8 09/19/2023     09/19/2023    CREATININE 0.69 09/19/2023    BUN 11 09/19/2023    CO2 28 09/19/2023    HGBA1C 6.2 (H) 07/22/2024     Lab Results   Component Value Date    GLUCOSE 116 (H) 09/19/2023    CALCIUM 9.6 09/19/2023     09/19/2023    K 3.8 09/19/2023    CO2 28 09/19/2023     09/19/2023    BUN 11 09/19/2023    CREATININE 0.69 09/19/2023      Lab Results   Component Value Date    HGBA1C 6.2 (H) 07/22/2024    HGBA1C 5.4 10/27/2023    HGBA1C 5.7 (H) 06/03/2023     Lab Results   Component Value Date    CREATININE 0.69 09/19/2023           P.S: This note was completed using  [employer] voice recognition technology and may include unintended errors with respect to translation of words, typographical errors or grammar errors which may not have been identified while finalizing the chart.